# Patient Record
Sex: MALE | Race: WHITE
[De-identification: names, ages, dates, MRNs, and addresses within clinical notes are randomized per-mention and may not be internally consistent; named-entity substitution may affect disease eponyms.]

---

## 2018-09-04 NOTE — RAD
EXAM:

  CT Chest Without Intravenous Contrast



CLINICAL HISTORY:

  53 years old, male; Pain; Chest pain; Prior surgery; Surgery date: 1-6 

months; Surgery type: Port placement; Additional info: R sided cp, h/o lung ca



TECHNIQUE:

  Axial computed tomography images of the chest without intravenous contrast.  

All CT scans at this facility use at least one of these dose optimization 

techniques: automated exposure control; mA and/or kV adjustment per patient 

size (includes targeted exams where dose is matched to clinical indication); or 

iterative reconstruction.

  Coronal and sagittal reformatted images were created and reviewed.



COMPARISON:

  CHEST WO CT CHEST W/O 6/15/2018 11:40 AM



FINDINGS:

  Lungs:  Mild centrilobular emphysematous disease. Previously seen confluent 

volume loss encasing the right hilum is again identified with a maximal 

measurement of 7.7 cm (series 3, image 25) previously 5.7 cm. There is 

encasement with mild narrowing of the perihilar bronchi unchanged from prior. 

Tree in bud configuration nodules are seen apical posterior segment right upper 

lobe, lateral basal segment right lower lobe, and posterior basal segment left 

lower loss which are new. New pulmonary nodule posterior segment right upper 

lobe measures 0.6 cm (series 3, image 17). Pulmonary nodules versus 

consolidation with mild adjacent ground glass opacification posterior basal 

segment right lower lobe measures 1.5 cm (series 3, image 37) and lateral basal 

segment right lower lobe measuring 0.8 cm (series 3, image 36). Subsegmental 

atelectasis inferior lingula and lateral basal segment left lower lobe which is 

new compared to prior study. Peribronchial thickening of the left lower lobe 

bronchi which is advanced for the prior study with mucous plugs again seen.

  Pleural space:  Tiny left effusion. No right effusion. No pneumothorax.

  Heart:  Normal.  No cardiomegaly.  No pericardial effusion.

  Bones/joints:  No fractures. No suspicious bone lesions. Intrathecal lead 

terminates at T7.

  Soft tissues:  Normal. No mass or hemorrhage.

  Vasculature:  The aorta demonstrates mild atherosclerotic calcification.  No 

thoracic aortic aneurysm.

  Lymph nodes:  Normal.  No enlarged lymph nodes.

  Tubes, lines and devices:  Right chest infusion port terminates at the 

cavoatrial junction. No fluid collection or hyperemia around the port.



IMPRESSION:     

  1. Right perihilar posttreatment fibrosis worsening compared to prior study 

with interval onset multifocal endobronchial pneumonia with recurrent 

metastatic disease considered less likely. Findings superimposed on emphysema.



  2. New right upper lobe pulmonary nodule may be infectious inflammatory 

although attention on followup surveillance is recommended.

## 2018-09-05 NOTE — PN
Progress Note





- Progress Note


Date of Service: 09/05/18


SOAP: 


Subjective:


[]Still SOB and right sided chest pain. Chest pain sever. SOB started one week 

ago. Cough, non productive.  Low grade fevers, 99. No KASEY. Confusion and 

hallucination x 2 days. Thought dog was in car when not, thought was at work 

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  





PmHx:


- Stage III NSSLC, XRT and carbo/taxol


- MS


- PE in past, 2009


- COPD


- Bipolar





Acetaminophen (Tylenol Tab*)  650 mg PO Q4H PRN


   PRN Reason: FEVER/PAIN


Albuterol (Ventolin Hfa Inhaler*)  2 puff INH Q4H PRN


   PRN Reason: SOB/WHEEZING


Alprazolam (Xanax Tab*)  0.5 mg PO Q8H PRN


   PRN Reason: ANXIETY


Benzonatate (Tessalon Cap*)  100 mg PO BID PRN


   PRN Reason: COUGH


   Last Admin: 09/04/18 17:20 Dose:  100 mg


Citalopram Hydrobromide (Celexa Tab*)  40 mg PO DAILY [facility]


   Last Admin: 09/05/18 08:07 Dose:  40 mg


Docusate Sodium (Colace Cap*)  100 mg PO DAILY PRN


   PRN Reason: CONSTIPATION


Enoxaparin Sodium (Lovenox(*))  40 mg SUBCUT Q24H [facility]


   Last Admin: 09/04/18 17:20 Dose:  40 mg


Famotidine (Pepcid Tab*)  20 mg PO DAILY [facility]


   Last Admin: 09/05/18 08:07 Dose:  20 mg


Guaifenesin/Codeine Phosphate (Robitussin Ac 100mg-10mg*)  5 ml PO Q4H PRN


   PRN Reason: COUGH


   Last Admin: 09/05/18 08:07 Dose:  5 ml


Hydromorphone HCl (Dilaudid Tab*)  4 mg PO Q4H PRN


   PRN Reason: PAIN


   Last Admin: 09/05/18 09:39 Dose:  4 mg


Sodium Chloride (Ns 0.9% 1000 Ml*)  1,000 mls @ 125 mls/hr IV PER RATE [facility]


   Last Admin: 09/05/18 11:13 Dose:  125 mls/hr


Oxycodone HCl (Oxycontin(*))  15 mg PO BID [facility]


   Last Admin: 09/05/18 08:07 Dose:  15 mg


Polyethylene Glycol/Electrolytes (Miralax*)  17 gm PO DAILY PRN


   PRN Reason: CONSTIPATION


Zolpidem Tartrate (Ambien Tab*)  10 mg PO BEDTIME [facility]


   Last Admin: 09/04/18 21:26 Dose:  10 mg








Objective:


[]


 Vital Signs











Temp Pulse Resp BP Pulse Ox


 


 99.9 F   87   20   100/59   93 


 


 09/05/18 07:48  09/05/18 07:48  09/05/18 11:05  09/05/18 07:48  09/05/18 07:48








HEENT - no oral lesions. No LAD


Has bronchial congestion, crackles, R > L, no wheezing


RRR S1S2


Obese, NT ND


Ext Tr edema


Neuro- oriented to hospital, cohesive story, no hallucinations at this time. 





Assessment:


[]52 yo with h/o early stage NSSLC as well as chronic lung disease, history of 

PE, history of MS. Confusion, chest pain and SOB. Ddx: pneumonia, PE, delirium 

from inflammation or MS flair. 








Plan:


[]1. Pnemoni/Bronchitis


- Leofloxacin 500 mg IV daily


- Send sputum culture


2. COPD. Respiratory therapy, nebs


3. Possible PE


- Check Ddimer, non specific


- LE US


- Check V/Q scan and Lovenox empirically pending results. 


4. MS change. Check MRI brain. Held to check spine stimulator.


5. DNR and DNI, discussed with patient.

## 2018-09-05 NOTE — RAD
HISTORY: LE edema



COMPARISONS: None relevant



TECHNIQUE: Multiple transverse and longitudinal ultrasound images were obtained of the

bilateral lower extremities  from the level of the common femoral vein inferiorly through

to the infrapopliteal veins  using grayscale, color Doppler, and spectral Doppler imaging

with and without compression and with augmentation.



FINDINGS:

VEINS: The venous system of the bilateral lower extremities  is compressible throughout

its course, with normal flow on color Doppler imaging and normal response to augmentation

on spectral Doppler imaging. The second peroneal vein on the left is not well visualized

which may be secondary to technical limitations.



SOFT TISSUES: Unremarkable.



OTHER FINDINGS: None.



IMPRESSION: 

NO RIGHT LOWER EXTREMITY DEEP VEIN THROMBOSIS.

NO LEFT LOWER EXTREMITY DEEP VEIN THROMBOSIS.

## 2018-09-05 NOTE — RAD
Indication: Shortness of breath. History of PE.



Comparison: September 05, 2018 chest radiograph and September 04, 2018 CT.



Technique: Following administration of 9.790 mCi xenon-133 by inhalation anterior and

posterior ventilation images were obtained. Following the administration of  6.250 mCi of

Tc-99m macroaggregated albumin, perfusion images were obtained in multiple projections.



Report: The ventilation pattern is uniform with no evidence of air trapping. Photopenic

defect at the RIGHT upper lung zone corresponds with a MediPort.



Accounting for the photopenic defect related to the RIGHT chest wall Mediport there is

homogeneous perfusion throughout both lungs. 



IMPRESSION: 

#. No scintigraphic evidence for pulmonary embolism.

## 2018-09-05 NOTE — RAD
Indication: Shortness of breath.



2 views of the chest including dual energy PA views demonstrate neural stimulator leads in

place. Vascular congestion is noted. Central line is in place. Right hilar mass is noted.



IMPRESSION: Suggestion of a right hilar mass. Neurostimulator leads in place.

## 2018-09-06 NOTE — RAD
HISTORY: AMS, MRI contraindicated, h/o MS and NSCLC



COMPARISONS: May 02, 2017 



TECHNIQUE: Multiple contiguous axial CT scans were obtained of the head with and without 

intravenous contrast. 



FINDINGS: 

HEMORRHAGE/INFARCT: There is no hemorrhage or acute infarct.

MASSES/SHIFT: There is no mass or shift.



EXTRA-AXIAL SPACES: There are no extra-axial fluid collections.

SULCI AND VENTRICLES: The sulci and ventricles are normal in size and position for the

patient's stated age.



CEREBRUM: There is hypoattenuation of the periventricular and subcortical white matter.

There is no abnormal enhancement. 

BRAINSTEM: There are no focal parenchymal abnormalities.

CEREBELLUM: There are no focal parenchymal abnormalities.



VESSELS: The vessels are grossly normal.

PARANASAL SINUSES: The paranasal sinuses are clear.

ORBITS: The orbits are unremarkable.

BONES AND SOFT TISSUE: No bone or soft tissue abnormalities are noted.



OTHER: There is no abnormal enhancement. 



IMPRESSION: 

NO ACUTE INTRACRANIAL PATHOLOGY.

CHRONIC SMALL VESSEL ISCHEMIC CHANGES. NO ABNORMAL ENHANCEMENT.

## 2018-09-06 NOTE — CONS
CONSULTATION NOTE:

 

DATE OF CONSULT:  18

 

CONSULTING PROVIDER:  BIJAL Jenkins

 

REASON FOR CONSULT:  History of multiple sclerosis.

 

CHIEF COMPLAINT:  Generalized fatigue, "I want to establish care with a 
neurologist."

 

HISTORY OF PRESENT ILLNESS:  Mr. Rik Sanchez is a 53-year-old right-handed man
, who was diagnosed with multiple sclerosis in , who initially took Avonex 
for years and then was switched off to Copaxone in May 2017.  The patient had 
to stop after he was diagnosed with early stage non-small cell lung cancer as 
well as chronic lung disease.  He also has a history of pulmonary embolism.  
The patient presented on 18 with increased shortness of breath, confusion
, and chest pain.  He has been diagnosed with pneumonia and bronchitis.  
Initially, he was started on Zosyn for antibiotic therapy.  Neurology was 
consulted to evaluate for his history of multiple sclerosis.  The patient 
stated that he had last seen a neurologist 2 years ago.  He was driving to 
[place] to see Dr. Stanton, who retired.  He has not had any MRI studies.  He 
tried to get an MRI study 2 years ago at [facility], but due to 
his spinal cord stimulator that was implanted in , he is unable to obtain 
an MRI.  However, his stimulator does report that it is MRI conditional.  The 
patient has constellation of symptoms that include tremors, pain in the legs, 
spasticity, and generalized fatigue.  None of these symptoms are acute.  His 
significant other also complains of confusion and memory recall problems.  He 
reports no desire to want to do anything and feeling significantly down.  He 
has been feeling this way since .  Apparently, the patient seemed to have 
recent worsening of his symptoms over the last 1 week.  He does have an 
underlying infection, which is pneumonia, which can be playing a role with his 
neurological complaints.

 

The patient denied any new weakness or paresthesias.  The patient denied any 
new headaches, visual disturbance, swallowing difficulty, or impairment in 
bowel or bladder functions.  Goal of care today is to establish care with a 
neurologist according to the patient.

 

The patient had used the following medications:  Depakote, albuterol inhaler, 
and steroids over the last 48 hours.

 

PAST MEDICAL HISTORY:  Stage III non-small lung cancer.  He received radiation 
therapy and chemotherapy with carbo and Taxol.  Multiple sclerosis, PE, COPD, 
former tobacco use, bipolar disorder.

 

MEDICATIONS:  Home medications:

1.  Ambien 10 mg daily.

2.  Hydromorphone 4 mg p.o. every 6 hours as needed for pain.

3.  Guaifenesin 400 mg p.o. every 4 hours as needed.

4.  Benadryl 25 mg p.o. at bedtime as needed.

5.  Levalbuterol 1 puff every 4 hours as needed.

6.  Tizanidine 2 mg p.o. 3 times daily.

7.  Folic acid 1 mg p.o. daily.

8.  Xanax 0.25 mg p.o. 3 times daily as needed.

9.  Depakote 500 mg at bedtime.

10.  Citalopram 40 mg p.o. daily.

11.  Oxycodone 20 mg p.o. every 12 hours.

12.  Propranolol 10 mg p.o. 3 times daily.

13.  Multivitamins.

14.  Dextromethorphan 30 mg p.o. twice daily as needed.

 

ALLERGIES:  ADHESIVE TAPE, BEE VENOM.

 

FAMILY HISTORY:  Mother suffered from abdominal aortic aneurysm rupture.  
Father  of ALS and sister  of multiple sclerosis.

 

SOCIAL HISTORY:  The patient is on disability and is unable to work.

 

REVIEW OF SYSTEMS:  A 14-point review of systems was obtained and otherwise 
negative except for what was mentioned in the HPI.

 

PHYSICAL EXAM:  Vitals:  Temperature of 98.1, pulse of 94, respiratory rate of  
20, oxygen saturation of 96%, blood pressure of 109/59.  General:  Chronically 
ill- appearing man, who is obese, who is in no acute distress.  Alert, 
cooperative. Head is normocephalic, atraumatic.  Eyes:  Conjunctivae/corneas 
are clear.  No scleral icterus.  Neck is supple and symmetrical with no carotid 
bruits.  No lymphadenopathy.  Lungs:  Inspiratory and expiratory wheezing 
throughout the bibasilar area.  Cardiovascular:  Regular rhythm with normal S1, 
S2.  Extremities: Normal range of motion with no cyanosis.  Skin:  No skin 
lesions or laceration. Psych:  Flat affect, depressed mood, but otherwise easy 
to establish rapport. Neurological Examination:  The patient is awake, alert, 
and oriented to person, place, time, and general circumstances.  He does have 
psychomotor slowing and mild spastic dysarthria.  Cranial Nerves:  Normal 
confrontation testing bilaterally. Pupils are mid range and reactive.  
Sensation is intact on the forehead, cheeks, and jaw region bilaterally.  He 
has no facial droop.  He is able to hear throughout the history process.  
Normal strength against resistance.  Tongue is symmetrical and midline with no 
atrophy or fasciculation.  Motor Examination:  Generalized low activation, but 
otherwise no spasticity in the upper extremities.  He does have increasing tone 
of the lower extremities.  He has nearly normal strength throughout that is 
symmetrical 5/5, but again he required re-encouragement to try his best in the 
motor examination.  He does have action-induced tremors, right worse than left 
with low amplitude high frequency that resolves at rest.  Reflexes:  Right/left
, brachioradialis 3/3, biceps 3/3, triceps 3/3, patella 3/3, ankle 2/2, plantar 
flexor/flexor.  Sensation:  Reduced sensation to light touch and pinprick on 
the feet, but improved at the proximal limbs.  Reduced vibration at the great 
toes. Normal proprioception.  Coordination:  Normal finger-to-nose, but 
bradykinesia. Gait was not assessed.  The patient is walker dependent at 
baseline.

 

DIAGNOSTIC STUDIES/LAB DATA:  WBC 5.2, hemoglobin 11.4, hematocrit 34, platelet 
count is 219.  Sodium 139, potassium 4.2, chloride 102, carbon dioxide 24.  AST 
and ALT are normal.  UA is negative.  Valproic acid level is 39.

 

CT head with contrast completed on 18 showed no evidence of any 
intracranial disease and no mass effect.  There were no enhancing lesions.

 

ASSESSMENT AND RECOMMENDATIONS:  Mr. Rik Sanchez is a 53-year-old man with 
history of  multiple sclerosis with relapsing-remitting and possibly secondary 
progressive multiple sclerosis, who is not on any disease-modifying therapy 
since the diagnosis of lung cancer, who presented with pneumonia.  Neurology 
was consulted to evaluate for the patient's confusion and tremors.  The patient 
denied any new neurological deficits other than he has been feeling excessively 
fatigued and his partner is complaining regarding his memory and slowing 
cognition. Neurologically, the patient appears to have significant psychomotor 
slowing, but no focal neurological deficits with diffuse hyperreflexia on 
examination, which is consistent with his demyelinating disease.  Overall, I 
suspect the patient's tremors to be multifactorial related to:

1.  Steroid use.

2.  Multiple sclerosis.

3.  Depakote use.

4.  Albuterol use.

 

I did reassure him that hopefully once he gets off the steroids and consider 
lowering the dose of Depakote, his tremors would improve.

 

In regards to the fatigue, please check any reversible causes of fatigue such 
as vitamin D deficiency, vitamin B12, and thyroid disease.  I have ordered B12, 
vitamin D, and TSH to further evaluate.  I will follow up with lab results. 
However, again, the fatigue can be related to his multiple sclerosis, 
especially that it is not being treated at this time.  There are medications 
that can help with fatigue such as amantadine.  I do not recommend starting any 
medications at this time, as I do suspect the patient is being overmedicated, 
especially with narcotic therapy.  That brings up another point of medication-
induced fatigue.  The patient is on oxycodone, morphine, hydromorphone.  
Minimize some of the medications, especially we want to decrease the Ambien 10 
mg nighttime to possibly 5 mg.

 

History of multiple sclerosis.  I suspect relapsing-remitting with some 
secondary progression - the patient would like to follow up with us in the 
Neurology Clinic at [facility].  I will have our staff schedule an appointment within 
the next 4 weeks.  We do need to discuss long-term disease-modifying therapy.  
I think going back on Copaxone would be the best option for him.  The patient 
is not on chemotherapy any longer and therefore should be on some disease-
modifying therapy.  The important thing here is we need to obtain some imaging 
of the brain and spinal cord with and without contrast, which we can do as an 
outpatient.

 

In the setting of pneumonia, I do not suspect the patient to have multiple 
sclerosis exacerbation.  The patient also agrees given that his symptoms are 
chronic and stable at this point.

 

TIME SPENT:  I spent 70 minutes of which greater than 50% was spent obtaining 
history, reviewing the chart, examining the patient, and discussing the 
treatment plan and options as mentioned above.

 

I will sign off, but will follow up with the laboratory results.  Please 
contact me for any questions or concerns.

 

 [id number]/[id number] #: [id number]

KARY

## 2018-09-06 NOTE — PN
Progress Note





- Progress Note


Date of Service: 09/06/18


SOAP: 


Subjective:


[Reports feeling poorly.  Complaining of severe R sided chest pain and feeling 

of chest restriction.  Productive cough.  Tmax 100.5F.  He has a fine tremor 

and remains quite weak.  He is concern that his current symptoms represent MS 

exacerbation and  requests to be treated with IV Solumedrol.]








Objective:


[


 Laboratory Results - last 24 hr











  09/06/18 09/06/18 09/06/18





  07:45 07:52 07:52


 


WBC   5.2 


 


RBC   3.84 L 


 


Hgb   11.4 L 


 


Hct   34 L 


 


MCV   89 


 


MCH   30 


 


MCHC   34 


 


RDW   13 


 


Plt Count   219 


 


MPV   8.5 


 


Neut % (Auto)   70.3 


 


Lymph % (Auto)   11.4 L 


 


Mono % (Auto)   16.8 H 


 


Eos % (Auto)   1.4 


 


Baso % (Auto)   0.1 


 


Absolute Neuts (auto)   3.7 


 


Absolute Lymphs (auto)   0.6 L 


 


Absolute Monos (auto)   0.9 H 


 


Absolute Eos (auto)   0.1 


 


Absolute Basos (auto)   0 


 


Absolute Nucleated RBC   0 


 


Nucleated RBC %   0 


 


Sodium    139


 


Potassium    4.2


 


Chloride    102


 


Carbon Dioxide    24


 


Anion Gap    13 H


 


BUN    8


 


Creatinine    0.68


 


Est GFR ( Amer)    147.0


 


Est GFR (Non-Af Amer)    121.5


 


BUN/Creatinine Ratio    11.8


 


Glucose    87


 


Calcium    8.6


 


Total Bilirubin    0.90


 


AST    24


 


ALT    19


 


Alkaline Phosphatase    79


 


B-Natriuretic Peptide  137 H  


 


Total Protein    5.9 L


 


Albumin    3.4


 


Globulin    2.5


 


Albumin/Globulin Ratio    1.4


 


Vitamin B12    545


 


25-OH Vitamin D Total    18.0 L


 


TSH    1.42

















Acetaminophen (Tylenol Tab*)  650 mg PO Q4H PRN


   PRN Reason: FEVER/PAIN


   Last Admin: 09/05/18 18:21 Dose:  650 mg


Albuterol (Ventolin Hfa Inhaler*)  2 puff INH Q4H PRN


   PRN Reason: SOB/WHEEZING


Albuterol/Ipratropium (Duoneb (Albuterol 2.5 Mg/Ipratropium 0.5 Mg))  1 neb INH 

Q4H PRN


   PRN Reason: SOB/WHEEZING


   Last Admin: 09/06/18 09:53 Dose:  1 neb


Alprazolam (Xanax Tab*)  0.5 mg PO Q8H PRN


   PRN Reason: ANXIETY


Benzonatate (Tessalon Cap*)  100 mg PO BID PRN


   PRN Reason: COUGH


   Last Admin: 09/06/18 17:52 Dose:  100 mg


Docusate Sodium (Colace Cap*)  100 mg PO DAILY PRN


   PRN Reason: CONSTIPATION


Famotidine (Pepcid Tab*)  20 mg PO DAILY [facility]


   Last Admin: 09/06/18 08:49 Dose:  20 mg


Guaifenesin/Codeine Phosphate (Robitussin Ac 100mg-10mg*)  5 ml PO Q4H PRN


   PRN Reason: COUGH


   Last Admin: 09/06/18 17:52 Dose:  5 ml


Hydromorphone HCl (Dilaudid Tab*)  6 mg PO Q4H PRN


   PRN Reason: PAIN


   Last Admin: 09/06/18 12:30 Dose:  6 mg


Piperacillin Sod/Tazobactam (Sod 3.375 gm/ Sodium Chloride)  100 mls @ 25 mls/

hr IVPB Q8H [facility]


   Last Admin: 09/06/18 15:53 Dose:  25 mls/hr


Morphine Sulfate (Morphine Vial*)  5 mg IV Q4H PRN


   PRN Reason: PAIN


   Last Admin: 09/06/18 15:53 Dose:  5 mg


Oxycodone HCl (Oxycontin(*))  30 mg PO BID [facility]


Polyethylene Glycol/Electrolytes (Miralax*)  17 gm PO DAILY PRN


   PRN Reason: CONSTIPATION


Prednisone (Deltasone Tab*)  40 mg PO DAILY [facility]


Zolpidem Tartrate (Ambien Tab*)  10 mg PO BEDTIME [facility]


   Last Admin: 09/05/18 22:42 Dose:  10 mg








Vital Signs:











Temp Pulse Resp BP Pulse Ox


 


 98.0 F   80   22   127/86   93 


 


 09/06/18 19:43  09/06/18 19:43  09/06/18 19:43  09/06/18 19:43  09/06/18 19:43








Exam


Gen: Chronically ill appearing 52 yo male who appears uncomfortable and 

accompanied by his wife


HEENT: MMM, no thrush


CV: RRR, no m/r/g


Resp: diffuse rhonchi and occasional wheeze


Abd: soft, nonTTP


Ext: trace edema


Psych: alert, appropriately oriented.  Confusion appears to have resolved


Neuro: fine tremor with intention.  Strength grossly intact]








[Assessment:


[]52 yo with h/o early stage NSCLC as well as chronic lung disease, history of 

PE, and MS. Admitted with Confusion, chest pain and SOB. Ddx: pneumonia, PE, 

delirium from inflammation or MS flair. PE w/u neg with neg LE doppler and VQ 

scan.  Empiric anticoagulation stopped.








Plan:


[]1. Pneumonia


- cont Zosyn


- febrile overnight


- productive cough


- associated COPD exacerbation


2. COPD exacerbation


- cont prn nebs


- start corticosteroids


4. AMS


- MRI brain ordered


- per radiology, nerve stimulator is not compatible with MRI although patient 

believes he has had MRIs with stimulator in place


- CT brain with contrast completed that shows no NAD


- discussed case with neurology and requested consultation


- if any MS symptoms are exacerbated, likely due to acute infection and high 

dose steroids would not be indicated


- requested most recent progress note from [facility] neurologist


5. Chronic pain


- managed by pain specialist out of [place]


- exacerbated by coughing


- increased oxycontin dosing and prn dilaudid


6. NSCLC


- no evidence of recurrent disease


7. DNR and DNI


]

## 2018-09-07 NOTE — PN
NEUROLOGY PROGRESS NOTE:

 

DATE OF SERVICE:  09/07/18

 

REASON FOR FOLLOWUP:  Generalized fatigue.

 

SUBJECTIVE:  The patient is still having a trouble with tremors this morning.  He feels fatigued.  He
 still received all the medications that I recommended to decrease yesterday such as the Ambien, oxyc
odone, morphine, hydromorphone.

 

The patient's vitamin D levels came back significant low at 18.  TSH is normal at 1.42.  Vitamin B12 
is normal at 545.

 

Medications unchanged from yesterday.

 

PHYSICAL EXAMINATION:  Vitals:  Temperature 98 degrees, pulse of 84, respiratory rate of 18, oxygen s
aturation 97%.  Blood pressure 100/67.  General:  Chronically ill appearing fatigued man, in no acute
 distress.  He has bilateral and truncal tremors that are high frequent and high amplitude and worse 
with action.  He has no resting tremor.  Head:  Normocephalic, atraumatic.  Eyes:  Conjunctivae/corne
as are clear.  Neck is supple and symmetrical with no carotid bruit.  Neurological:  The patient is a
wake, alert, and oriented to person, place, time, and general circumstances with psychomotor slowing.
  Pupils equal, round and reactive to light. Extraocular muscles are intact.  No facial asymmetry.  T
ongue is symmetrical and midline with no atrophy.  Generalized low activation, but otherwise no spast
icity in the upper extremities.  He does have increased tone in the lower extremities. He has normall
y strength 5/5 throughout the extremities.  Hyperreflexia except for 2/2 at the ankles.  Reduced sens
ation to light and pinprick in the feet, but improved at the proximal limbs.  He has got bradykinesia
 to finger-to-nose, but no asymmetry or dysmetria.

 

ASSESSMENT:  Mr. Rik Sanchez is a 53-year-old male with a history of multiple sclerosis of relapsing
 remitting and possibly secondary to progressive multiple sclerosis who is not on any disease-modifyi
ng therapy who is complaining of generalized fatigue.  The patient does have a history of lung cancer
 status post chemotherapy and radiation.  The patient's fatigue is probably related to polypharmacy a
s well as vitamin D deficiency.  I started him on daily vitamin D supplements 2000 units daily.  He s
hould follow up at our neurology clinic in 4 to 6 weeks.  It is reasonable to put him on Copaxone onc
e he follows up.  He also will need outpatient MRI as the patient spinal stimulator that is MRI condi
tional. Further evaluation should be done in the future.  I do not suspect he has multiple sclerosis 
exacerbation given his symptoms are mostly nonspecific and most likely are trigged from the pneumonia
.  I will sign off.  Please contact us for any questions or concerns.

 

 [id number]/[facility] #: [id number]

## 2018-09-09 NOTE — DS
CC:  Wilmer Pompa DO; Dr. Alissa Sánchez; [facility] *

 

DISCHARGE SUMMARY:

 

DATE OF ADMISSION:  18

 

DATE OF DISCHARGE:  18

 

PRIMARY CARE PROVIDER:  Wilmer Pompa DO

 

PRIMARY ONCOLOGIST:  Alissa Sánchez MD

 

CONSULTING NEUROLOGIST:  Dr. Araujo.

 

ATTENDING PHYSICIAN:  Dmitriy Naranjo MD * (DICTATED BY BIJAL LANDURM)

 

DISCHARGING PROVIDER:  BIJAL Landrum

 

PRIMARY DISCHARGE DIAGNOSES:

1.  Pneumonia.

2.  Chronic obstructive pulmonary disease exacerbation.

3.  Altered mental status - most likely secondary to pneumonia.

4.  Chronic pain - exacerbated by acute illness.

5.  Multiple sclerosis.

6.  Non-small cell lung carcinoma, status post concurrent chemoradiation 
finished in 2017.

 

SECONDARY DISCHARGE DIAGNOSIS:  Bipolar disorder.

 

DISCHARGE MEDICATIONS:

1.  Alprazolam 0.25 mg p.o. 3 times daily as needed for anxiety.

2.  Symbicort 1 puff inhaled twice daily.

3.  Citalopram 40 mg p.o. daily.

4.  Delsym 30 mg p.o. twice daily as needed for cough.

5.  Diphenhydramine 25 mg p.o. at bedtime as needed.

6.  Depakote 500 mg p.o. at bedtime.

7.  Folic acid 1 mg p.o. daily.

8.  Xopenex 1 puff inhaled q.4 hours as needed for shortness of breath.

9.  Multivitamin 1 tablet p.o. daily.

10.  Propranolol 20 mg 3 times daily.

11.  Tizanidine 2 mg p.o. 3 times daily.

12.  Ambien 10 mg p.o. at bedtime.

13.  Augmentin 875 mg p.o. twice daily x10 days.

14.  Tessalon Perles 100 mg p.o. twice daily as needed for cough.

15.  Vitamin D 2000 units p.o. daily.

16.  Guaifenesin/codeine 5 mL p.o. q.4 hours as needed for cough.

17.  Hydromorphone 6 mg p.o. q.4 hours as needed for pain.

18.  OxyContin 30 mg p.o. twice daily.

19.  Prednisone with instructions to take 40 mg x3 days, then 20 mg x3 days, 
then 10 mg x3 days.

 

MEDICATION CHANGES:

1.  Increase hydromorphone.

2.  Increase OxyContin.

3.  Augmentin x10 days.

4.  Start p.r.n. Tessalon.

5.  Start p.r.n. Robitussin A-C.

6.  Prednisone with a tapering dose as described above.

7.  Start vitamin D.

 

HOSPITAL IMAGIN.  CT chest without contrast shows a right perihilar posttreatment fibrosis, 
which is worse when compared to prior study with interval onset of multifocal 
endobronchial pneumonia with recurrent metastatic disease considered as less 
likely with findings of superimposed emphysema.  There is a new right upper 
pulmonary nodule, which may be infectious or inflammatory, but followup imaging 
is recommended.

2.  Chest x-ray on 17 showed suggestion of a right hilar mass and the 
stimulator leads in place.

3.  Venous Doppler study is negative for DVT bilaterally.

4.  V/Q scan is a low probability scan.

5.  CT brain shows some chronic microvascular changes, but no acute disease 
including obvious metastatic focus.

 

HOSPITAL COURSE:  This is a 53-year-old gentleman with a history of limited 
stage non-small cell lung cancer that is treated approximately a year ago with 
concurrent chemo and radiation.  He has been in active surveillance with 
imaging being CT of the chest from 2018, which did not show any recurrent 
disease.  The patient presented to medical oncology office for followup with 
multiple complaints.  He had developed a cough with right-sided chest pain in 
addition to some increased confusion and general lethargy and overall weakness.
  Initial concern was for CNS pathology and the patient was referred to the 
hospital for direct admission.  To further complicate his medical history is 
known multiple sclerosis for which he has been off of medical therapy since his 
cancer diagnosis approximately 16 months ago and had been followed by Neurology 
at [place].  Additionally, the patient has bipolar disorder for which he is 
treated by Psychiatry with Depakote.  In , he had Depakote and ammonia 
levels checked, which showed elevated ammonia levels and he was subsequently 
started on lactulose, but was unable to tolerate this medication.  The Depakote 
had been decreased from 500 mg twice daily to 500 mg once daily at bedtime as 
well.

 

Initial labs showed essentially normal CBC with just mild normocytic anemia 
with hemoglobin of 11.9 g/dL.  Comprehensive metabolic panel was unremarkable.  
CRP was significantly elevated at 291.  CT of the chest was obtained, which 
showed findings consistent with what was most likely multifocal pneumonia.  MRI 
of the brain with and without contrast was ordered, but then became apparent 
the patient had a nerve stimulator in place in his back and research from the 
 showed that this was not MRI compatible.  Then, a CT head was 
completed with contrast instead, which showed no acute disease.  Ammonia levels 
were also checked and noted to be normal in the mid 50s on two occasions.

 

The patient subsequently developed a fever and had persistent cough with severe 
right-sided chest pain.  He had a noted history of PE and for this reason, 
lower extremity Doppler and V/Q scan were both performed, which were both 
negative for obvious clot.  The patient was started on Zosyn for his pneumonia.

 

The patient complained of severe right-sided chest pain mostly associated with 
deep inspiration and coughing, most likely due to pleurisy related to the 
pneumonia.  He is followed by pain management specialist at [facility] 
for chronic pain medications including OxyContin and Dilaudid were increased 
during his hospitalization.

 

In addition to chest pain as described above, the patient had significant chest 
tightness and rhonchi and wheezing on exam.  _____ COPD exacerbation with 
nebulizer treatment and steroids.

 

The patient also complained of generalized weakness and developed a resting 
tremor during his hospitalization.  The concern for an exacerbation of his MS 
was brought up and neurologist, Dr. Araujo, was consulted.  He suggested that 
exacerbation of his MS could be responsible for his tremors; but suggested that 
in the setting of acute illness, it is more likely that his current neurologic 
symptoms were result of an acute infectious process and he did not require 
specific treatment for an MS flare, although he did recommend a close followup 
with Neurology with recommendation to potentially reinitiate medical therapy.

 

DISPOSITION AND FOLLOWUP PLAN:  The patient is being discharged to home where 
he lives with his wife.  Recommended 10 days of Augmentin for treatment of his 
pneumonia as well as a steroid taper for his COPD exacerbation.  
Recommendations were Neurology were to decrease or discontinue the Depakote due 
to his complaints of tremor.  This can be addressed with his primary care 
provider and/or mental health provider depending on the stability of his 
bipolar disorder and the status of his tremor after leaving the hospital.  Of 
note, CT scan was performed during this hospitalization did note a new 
pulmonary nodule as well as what appeared to be multifocal pneumonia.  We will 
plan followup in the oncology clinic in approximately 6 weeks with a repeat CT 
scan prior to that.  The patient requires followup with his primary care 
provider within 1 week of discharge.

 

____________________________________ MARTÍNEZ IVERSON, PA

 

[id number]/[facility] #: [id number]

[facility]

## 2018-12-03 NOTE — ED
Progress





- Progress Note


Progress Note: 





Patient is medically cleared for discharge by Mental Health Examiner and Dr. Barker (Psych). 





- Consult/PCP


Time Called: 11:46





Course/Dx





- Course


Course Of Treatment: Patient was signed out from Dr. Stewart at end of shift, 

pending MHE.  Patient was seen by MHE at 1950. Patient is medically cleared for 

discharge by Mental Health Examiner and Dr. Barker (Psych).





- Diagnoses


Provider Diagnoses: 


 Depression








- Provider Notifications


Discussed Care Of Patient With: Grzegorz Barker - Psych


Time Discussed With Above Provider: 19:50 - We discussed patient care with Dr. Barker (Psych) at 1950 and he recommended discharging patient home.





Discharge





- Sign-Out/Discharge


Documenting (check all that apply): Patient Departure, Receiving Sign-Out


Receiving patient FROM: Clifton Stewart





- Discharge Plan


Condition: Stable


Disposition: HOME


Patient Education Materials:  Depression (ED)


Referrals: 


Wilmer Pompa,  [Primary Care Provider] - 2 Days


Additional Instructions: 


Return to the ED for any new or worsening symptoms.





- Attestation Statements


Document Initiated by Scribe: Yes


Documenting Scribe: Ricco Larios


Provider For Whom Scribe is Documenting (Include Credential): Mark Kraft MD


Scribe Attestation: 


Ricco MAYES, scribed for Mark Kraft MD on 12/03/18 at 1958. 


Status of Scribe Document: Ready
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
## 2020-01-08 ENCOUNTER — HOSPITAL ENCOUNTER (INPATIENT)
Dept: HOSPITAL 25 - ED | Age: 55
LOS: 6 days | Discharge: HOME HEALTH | DRG: 885 | End: 2020-01-14
Attending: PSYCHIATRY & NEUROLOGY | Admitting: PSYCHIATRY & NEUROLOGY
Payer: MEDICARE

## 2020-01-08 DIAGNOSIS — Z81.1: ICD-10-CM

## 2020-01-08 DIAGNOSIS — G47.33: ICD-10-CM

## 2020-01-08 DIAGNOSIS — R45.851: ICD-10-CM

## 2020-01-08 DIAGNOSIS — F32.2: Primary | ICD-10-CM

## 2020-01-08 DIAGNOSIS — Z85.118: ICD-10-CM

## 2020-01-08 DIAGNOSIS — Z92.3: ICD-10-CM

## 2020-01-08 DIAGNOSIS — Z91.19: ICD-10-CM

## 2020-01-08 DIAGNOSIS — Z92.21: ICD-10-CM

## 2020-01-08 DIAGNOSIS — G89.29: ICD-10-CM

## 2020-01-08 DIAGNOSIS — Z99.81: ICD-10-CM

## 2020-01-08 DIAGNOSIS — Z66: ICD-10-CM

## 2020-01-08 DIAGNOSIS — Z88.8: ICD-10-CM

## 2020-01-08 DIAGNOSIS — J18.9: ICD-10-CM

## 2020-01-08 DIAGNOSIS — J44.9: ICD-10-CM

## 2020-01-08 DIAGNOSIS — G35: ICD-10-CM

## 2020-01-08 DIAGNOSIS — M62.838: ICD-10-CM

## 2020-01-08 DIAGNOSIS — Z87.891: ICD-10-CM

## 2020-01-08 LAB
ALBUMIN SERPL BCG-MCNC: 3.2 G/DL (ref 3.2–5.2)
ALBUMIN/GLOB SERPL: 1 {RATIO} (ref 1–3)
ALP SERPL-CCNC: 52 U/L (ref 34–104)
ALT SERPL W P-5'-P-CCNC: 38 U/L (ref 7–52)
ANION GAP SERPL CALC-SCNC: 8 MMOL/L (ref 2–11)
AST SERPL-CCNC: 30 U/L (ref 13–39)
BASOPHILS # BLD AUTO: 0 10^3/UL (ref 0–0.2)
BUN SERPL-MCNC: 12 MG/DL (ref 6–24)
BUN/CREAT SERPL: 14 (ref 8–20)
CALCIUM SERPL-MCNC: 8.5 MG/DL (ref 8.6–10.3)
CHLORIDE SERPL-SCNC: 104 MMOL/L (ref 101–111)
EOSINOPHIL # BLD AUTO: 0.3 10^3/UL (ref 0–0.6)
GLOBULIN SER CALC-MCNC: 3.2 G/DL (ref 2–4)
GLUCOSE SERPL-MCNC: 92 MG/DL (ref 70–100)
HCO3 SERPL-SCNC: 27 MMOL/L (ref 22–32)
HCT VFR BLD AUTO: 37 % (ref 42–52)
HGB BLD-MCNC: 12.4 G/DL (ref 14–18)
LYMPHOCYTES # BLD AUTO: 0.6 10^3/UL (ref 1–4.8)
MCH RBC QN AUTO: 30 PG (ref 27–31)
MCHC RBC AUTO-ENTMCNC: 34 G/DL (ref 31–36)
MCV RBC AUTO: 89 FL (ref 80–94)
MONOCYTES # BLD AUTO: 0.5 10^3/UL (ref 0–0.8)
NEUTROPHILS # BLD AUTO: 6.3 10^3/UL (ref 1.5–7.7)
NRBC # BLD AUTO: 0 10^3/UL
NRBC BLD QL AUTO: 0
PLATELET # BLD AUTO: 288 10^3/UL (ref 150–450)
POTASSIUM SERPL-SCNC: 3.9 MMOL/L (ref 3.5–5)
PROT SERPL-MCNC: 6.4 G/DL (ref 6.4–8.9)
RBC # BLD AUTO: 4.11 10^6 /UL (ref 4.18–5.48)
SODIUM SERPL-SCNC: 139 MMOL/L (ref 135–145)
WBC # BLD AUTO: 7.8 10^3/UL (ref 3.5–10.8)

## 2020-01-08 PROCEDURE — 80329 ANALGESICS NON-OPIOID 1 OR 2: CPT

## 2020-01-08 PROCEDURE — 81003 URINALYSIS AUTO W/O SCOPE: CPT

## 2020-01-08 PROCEDURE — 80061 LIPID PANEL: CPT

## 2020-01-08 PROCEDURE — 83036 HEMOGLOBIN GLYCOSYLATED A1C: CPT

## 2020-01-08 PROCEDURE — 80307 DRUG TEST PRSMV CHEM ANLYZR: CPT

## 2020-01-08 PROCEDURE — 82803 BLOOD GASES ANY COMBINATION: CPT

## 2020-01-08 PROCEDURE — 99284 EMERGENCY DEPT VISIT MOD MDM: CPT

## 2020-01-08 PROCEDURE — 80053 COMPREHEN METABOLIC PANEL: CPT

## 2020-01-08 PROCEDURE — 90732 PPSV23 VACC 2 YRS+ SUBQ/IM: CPT

## 2020-01-08 PROCEDURE — 80320 DRUG SCREEN QUANTALCOHOLS: CPT

## 2020-01-08 PROCEDURE — 99222 1ST HOSP IP/OBS MODERATE 55: CPT

## 2020-01-08 PROCEDURE — 84443 ASSAY THYROID STIM HORMONE: CPT

## 2020-01-08 PROCEDURE — 36415 COLL VENOUS BLD VENIPUNCTURE: CPT

## 2020-01-08 PROCEDURE — 71045 X-RAY EXAM CHEST 1 VIEW: CPT

## 2020-01-08 PROCEDURE — 80048 BASIC METABOLIC PNL TOTAL CA: CPT

## 2020-01-08 PROCEDURE — 99238 HOSP IP/OBS DSCHRG MGMT 30/<: CPT

## 2020-01-08 PROCEDURE — 85025 COMPLETE CBC W/AUTO DIFF WBC: CPT

## 2020-01-08 PROCEDURE — G0480 DRUG TEST DEF 1-7 CLASSES: HCPCS

## 2020-01-08 PROCEDURE — 99231 SBSQ HOSP IP/OBS SF/LOW 25: CPT

## 2020-01-08 PROCEDURE — 99233 SBSQ HOSP IP/OBS HIGH 50: CPT

## 2020-01-08 PROCEDURE — 94640 AIRWAY INHALATION TREATMENT: CPT
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
## 2020-01-09 LAB
APAP SERPL-MCNC: < 15 MCG/ML
SALICYLATES SERPL-MCNC: < 2.5 MG/DL (ref ?–30)
TSH SERPL-ACNC: 2.02 MCIU/ML (ref 0.34–5.6)

## 2020-01-09 RX ADMIN — BACLOFEN SCH MG: 20 TABLET ORAL at 12:50

## 2020-01-09 RX ADMIN — THERA TABS SCH TAB: TAB at 11:44

## 2020-01-09 RX ADMIN — ZOLPIDEM TARTRATE SCH MG: 10 TABLET, FILM COATED ORAL at 20:44

## 2020-01-09 RX ADMIN — LEVOFLOXACIN SCH MG: 250 TABLET, FILM COATED ORAL at 11:45

## 2020-01-09 RX ADMIN — PANTOPRAZOLE SODIUM SCH MG: 40 TABLET, DELAYED RELEASE ORAL at 11:45

## 2020-01-09 RX ADMIN — HYDROMORPHONE HYDROCHLORIDE PRN MG: 4 TABLET ORAL at 12:41

## 2020-01-09 RX ADMIN — OXYCODONE HYDROCHLORIDE SCH MG: 10 TABLET, FILM COATED, EXTENDED RELEASE ORAL at 20:43

## 2020-01-09 RX ADMIN — MOMETASONE FUROATE AND FORMOTEROL FUMARATE DIHYDRATE SCH PUFF: 100; 5 AEROSOL RESPIRATORY (INHALATION) at 20:11

## 2020-01-09 RX ADMIN — LORAZEPAM ONE MG: 2 INJECTION INTRAMUSCULAR; INTRAVENOUS at 06:03

## 2020-01-09 RX ADMIN — BACLOFEN SCH MG: 20 TABLET ORAL at 19:22

## 2020-01-09 RX ADMIN — MOMETASONE FUROATE AND FORMOTEROL FUMARATE DIHYDRATE SCH: 100; 5 AEROSOL RESPIRATORY (INHALATION) at 19:51

## 2020-01-09 RX ADMIN — LORAZEPAM ONE MG: 2 INJECTION INTRAMUSCULAR; INTRAVENOUS at 06:15

## 2020-01-09 RX ADMIN — ALPRAZOLAM PRN MG: 0.5 TABLET ORAL at 12:41

## 2020-01-09 RX ADMIN — IPRATROPIUM BROMIDE AND ALBUTEROL SULFATE PRN NEB: .5; 3 SOLUTION RESPIRATORY (INHALATION) at 12:13

## 2020-01-09 RX ADMIN — ACETAMINOPHEN PRN MG: 325 TABLET ORAL at 11:44

## 2020-01-09 RX ADMIN — CITALOPRAM SCH MG: 40 TABLET ORAL at 11:45

## 2020-01-09 RX ADMIN — HYDROMORPHONE HYDROCHLORIDE PRN MG: 4 TABLET ORAL at 23:53

## 2020-01-09 NOTE — HP
H&P (Free Text)


History and Physical: 


 


Justification for admission:  Immediate Safety. 





CC " I just want to be comfortable"





The patient was brought to [facility] by his brother after he placed 

guns on his bed in order to use them to end his life. He was treated at 

[facility] for pneumonia and received supportive measures against 

his wishes because he did not have MOLST form completed. He  reported having 

stage 4 lung cancer and fears being in pain and feeling that he is being 

suffocated. He reported being angry about not getting comfort care after he 

thought the necessary paperwork was already completed. He explained that he has 

been thinking about his code status for some time (since 2016) and after 

receiving radiation and chemotherapy wishes to only receive comfort care. He 

wished to appoint his brother Nader to be his health care proxy. He explained the 

nature of his illness and expressed the degree of suffering he has endured. The 

guns were removed from the home and this was confirmed by his brother.


He reported poor sleep and normal appetite. The patient reported being in pain 

and has difficulty breathing most of the day and is upset at his girlfriend 

because he thought she filled out the paperwork so that he would not receive 

life sustaining treatment. The patient denied homicidal ideation intent or 

plan. The patient denied auditory and/  or visual hallucinations. 





MDD


Reported  feeling depressed while having diminished interests which were found 

to be enjoyable in the past. Reported feelings of hopelessness , and  

worthlessness. Reported  loss of energy  or lack of motivation to complete 

tasks. Reported thoughts that he would be better off dead.  





Anxiety 


Denied having symptoms of anxiety such as having times where heart feels that 

it is beating out of chest  , sweaty palms, or shallow breathing.  Denied 

having uncomfortable or intrusive thoughts. Denied  feeling restless, high 

strung, or worrying too much most of the time. 





Bipolar  


Denied symptoms of sathish such as having many ideas at once. Denied increased 

talkativeness where  no one can interrupt.  


Denied  feeling irritable most of the time while having an persistent abundance 

of energy most of the day without the use of energy drinks, stimulants, or 

recreational drug use. Denied an increase in intensity in goal directed 

activities. Denied having the decreased need to sleep for days , having 

prolonged elevated  mood , or feeling on top of the world. 


 Denied impulsive risky sexual encounters. Denied spending money recklessly , 

going on spending sprees wiping out savings.  Denied impulsively traveling out 

of town or country, having super martel, and unrealistic wealth or fame.  





Psychosis 


Does not endorse  hearing  things that other people do not hear or seeing 

things other people do not see.  Denied  feeling that TV is making references. 

Denied  feeling that people are spying  , following , or reading their  

thoughts. 





Phobias: 


Patient denied having excessive fear of a particular thing or situation. 





Eating disorders:


 Patient denied having excessive eating habits or feelings of guilt after 

eating. Denied repeated episodes of self induced vomiting after eating. 





PTSD


Denied flashbacks, nightmares and avoidance of a prior traumatic event.  





PAST PSYCHIATRIC HISTORY:


         Prior Diagnosis :  Major Depressive Disorder


         History of past Psychiatric Hospitalizations:  1 prior psychiatric 

admission at [facility].


          History of past suicide/homicide attempts : Refused to elaborate but 

mentioned 1 suicide attempt unknown method in 2016. Denied self injurious 

behaviors.  


         Outpatient follow-up:  Dr. Pompa PCP


         Medications: Past trials of medications include celexa, remeron 

alprazolam, zolpidem 


         Guardianship: None. Health care proxy Nader his brother





 FAMILY HISTORY: 


- Suicide: Denied family history of suicide. 


- Mental illness:  Denied a history of mental health in immediate family 

members.


- Substance abuse: Father abused alcohol





SUBSTANCE ABUSE HISTORY:


- EtOH: Denied  No associated legal issues, blackouts, seizures, DTs or past 

hospitalizations due to alcohol.  


- Tobacco:  smoked since age 11 until 2016 and quit 3 years ago declined 

replacement therapy


- Cannabis: Denied 


- Heroin: Denied 


- Cocaine: Denied 


- Substance abuse treatment: Denied past substance abuse treatment 





SOCIAL HISTORY:


Born in [facility]  and raised by both parents.  


- Living situation:  Currently lives in [facility] with brother and girlfriend 


- Employment history: worked as EMT for 30 years


- Relationship:  4 children and currently not  but in a relationship 

with his girlfriend whom he lives with


- Legal history: Denied


-  service history: Denied





PAST MEDICAL HISTORY:    


Stage 4 Lung cancer, MS. 


- Allergies:  Hydromorphone 


 


Physical Exam:  


Please see ED note 





Mental Status Exam on Admission 


APPEARANCE :   54 year old  male using wheelchair to ambulate. Patient 

is disheveled and appears to have poor hygiene and grooming


BEHAVIOR: Irritable 


EYE CONTACT: Fair


PSYCHOMOTOR ACTIVITY: No psychomotor agitation or retardation. 


MOVEMENTS:  Bilateral upper extremity tremor 


SPEECH : coherent normal volume 


MOOD : "Angry"


AFFECT : 


Type is irritable and angry. 


Range is restricted 


Mood  Incongruent


THOUGHT PROCESS:  Formulated and organized in a logical, linear goal directed 

manner.   


No flight of ideas,  neologism (made up words) , perseveration , tangential , 

loose associations , or circumstantiality.


THOUGHT CONTENT: no delusions, obsessions, phobias or preoccupations.


PERCEPTION: No current auditory or visual hallucinations. Doesnt appear to be 

responding to internal cues. No evidence of depersonalization , de-realization, 

or illusions


SUICIDALITY     suicidal ideation with plan to end life with gun


HOMICIDALITY  Denied homicidal ideation, intent or plan.


Insight/judgment: Poor insight and judgment


ORIENTATION: Oriented to self, location, and time.





Diagnosis on Admission: Major depressive disorder. severe. 





Assessment:   54 year old  with history of depression  came to the 

hospital with suicidal ideation and plan to shoot himself and was admitted to 

the BSU at [facility]. 


Plan 





#Admit to BSU, Constant observation.  Start regular diet. Encourage 

participation in activities on the milieu.  


# Justification for Admission: For immediate safety per outlined in the [place] 

[place] Mental Hygiene Code. 


# The patient requires psychiatric inpatient admission at this time to assure 

safety, receive treatment and work toward stabilization.


# Labs ordered:  CBC, CMP, UDS, TSH, HBA1c, TSH, Toxicology screen, Urine 

analysis, and lipid profile.  


# Obtained collateral information from his brother Nader


# DNR status-  His brother and also his healthcare proxy are in agreement with 

DNR status and both wish to pursue hospice care upon discharge. 


# Collaboration with Social Work


#Constant Observation 


#Order for Oxygen compressor and Wheelchair 


# Firearms removed by  


# Hospital consult placed with plan to assist with MOLST form and oncology/ and 

palliative care resources 


# Safe ACT completed 


# Ethics consult completed with Nader Ruben 


# Patient has capacity to decide code status and at this time his  wishes 

include to be DNR code status. 


# Although patient has depression, at this time his judgment is not impaired in 

a way that does not interfere with his capacity to decide his medical 

decisions. 





#Goals before discharge include:  To eliminate/ reduce suicidal ideation





Tentative Discharge: Pending psychiatric stabilization





The risks, benefits, and alternative treatment options were discussed as well 

as the risks of refusing treatment.  After this discussion and an 

acknowledgement of this understanding was made.  A risk/ benefit assessment of 

treatment was considered and discussed with the patient. When comparing the 

risks of treatment with the dangers of not receiving treatment, the benefits of 

treatment outweigh the treatment risks at this time. Risks of allergy, suicidal 

ideation, behavioral changes, dystonia, rashes, electrolyte imbalances, 

movement disorders, cardiac conduction changes, serotonin syndrome, metabolic 

risks  were among some of the risks discussed. 











Acetaminophen (Tylenol Tab*)  650 mg PO Q4H PRN


   PRN Reason: for pain; or Temp >101 F


   Last Admin: 01/09/20 11:44 Dose:  650 mg


Al Hydrox/Mg Hydrox/Simethicone (Maalox Plus*)  30 ml PO Q4H PRN


   PRN Reason: INDIGESTION


Albuterol (Ventolin Hfa Inhaler*)  1 puff INH Q6HR PRN


   PRN Reason: SHORTNESS OF BREATH


Albuterol/Ipratropium (Duoneb (Albuterol 2.5 Mg/Ipratropium 0.5 Mg))  1 neb INH 

Q4H PRN


   PRN Reason: SHORTNESS OF BREATH


   Last Admin: 01/09/20 12:13 Dose:  1 neb


Alprazolam (Xanax Tab*)  1 mg PO TID PRN


   PRN Reason: ANXIETY


   Last Admin: 01/09/20 12:41 Dose:  1 mg


Baclofen (Lioresal  Tab*)  20 mg PO TID WITH MEALS [facility]


   Last Admin: 01/09/20 12:50 Dose:  20 mg


Citalopram Hydrobromide (Celexa Tab*)  40 mg PO DAILY [facility]


   Last Admin: 01/09/20 11:45 Dose:  40 mg


Diphenhydramine HCl (Benadryl Po*)  25 mg PO BEDTIME PRN


   PRN Reason: SLEEP


Epinephrine HCl (Adrenalin 1 Mg/Ml)  0.3 mg IM DAILY PRN


   PRN Reason: ALLERGY SYMPTOMS


Folic Acid (Folvite Tab*)  1 mg PO DAILY [facility]


Hydromorphone HCl (Dilaudid Tab*)  4 mg PO QID PRN


   PRN Reason: PAIN


   Last Admin: 01/09/20 12:41 Dose:  4 mg


Levofloxacin (Levaquin Tab*)  250 mg PO DAILY [facility]; Protocol


   Last Admin: 01/09/20 11:45 Dose:  250 mg


Miscellaneous (Ativan Pyxis Nuñez)  1 ea N/A .ATIVAN IV KEY PRN


   PRN Reason: PYXIS KEY


Mometasone Furoate/Formoterol Fumar (Dulera 100/5 Mdi*)  1 puff INH BID QUETA


Multivitamins/Minerals (Theragran/Minerals Tab*)  1 tab PO DAILY [facility]


   Last Admin: 01/09/20 11:44 Dose:  1 tab


Oxycodone HCl (Oxycontin(*))  20 mg PO Q12HR QUETA


Pantoprazole Sodium (Protonix Tab*)  40 mg PO DAILY [facility]


   Last Admin: 01/09/20 11:45 Dose:  40 mg


Zolpidem Tartrate (Ambien Tab*)  10 mg PO BEDTIME QUETA

## 2020-01-09 NOTE — ED
Progress





- Progress Note


Progress Note: 





Pt is a signout from Dr. Pina d/t  hold.











Re-Evaluation





- Re-Evaluation


  ** First Eval


Re-Evaluation Time: 07:00


Change: Unchanged


Comment: Pt is requesting something else for agitation. Will give Benadryl.





  ** Second Eval


Re-Evaluation Time: 05:53


Comment: Per Dr. Barker, pt will be admitted to [facility] with dx of 

unspecified depressive disorder.





Course/Dx





- Diagnoses


Provider Diagnoses: 


 Major depressive disorder, recurrent, unspecified








Discharge ED





- Sign-Out/Discharge


Documenting (check all that apply): Patient Departure, Receiving Sign-Out


Receiving patient FROM: Karli Pina





- Discharge Plan


Condition: Fair


Disposition: PSYCHIATRIC FACILITY-[facility]


Referrals: 


No Primary Care Phys,NOPCP [Primary Care Provider] - 





- Billing Disposition and Condition


Condition: FAIR


Disposition: Psychiatric Facility [facility]





- Attestation Statements


Document Initiated by Scribe: Yes


Documenting Scribe: Zulema Polo


Provider For Whom Sonia is Documenting (Include Credential): Bravo Guthrie MD.


Scribe Attestation: 


IZulema, scribed for Bravo Guthrie MD. on 01/09/20 at 1017. 


Scribe Documentation Reviewed: Yes


Provider Attestation: 


The documentation as recorded by the scribZulema santamaria accurately 

reflects the service I personally performed and the decisions made by me, 

Bravo Guthrie MD.


Status of Scribe Document: Viewed

## 2020-01-09 NOTE — PN
Hospitalist Progress Note


Date of Service: 01/09/20





Called for consult re code status and end of life decisions.





Mr. Sanchez is a 55 yo M with PMH of lung cancer, COPD on 4L NC, and MS; who 

presented to [facility] with suicidal ideations requiring admission to BSU. 





He reports wanting to be a DNR/DNI and is able to verbalize the meaning of 

those terms. He is also requesting comfort measure which he says he has spoke 

with his wife about. 





I filled out a MOLST form with the patient indicating DNR/DNI code status. This 

was placed in the chart. I advised him that he would need to speak further with 

his oncologist (Dr. Patton) regarding his wishes for comfort measures as it is 

unclear to me how advanced the disease is (most recent PET scan indicates no 

evidence of metastatic disease) and his chronic conditions are stable at this 

time, so there is no urgent need for initiation of comfort measures. He is 

agreeable to this plan. [facility] has contacted oncology for further 

assistance and input.





Thank you for this consultation. We will sign off at this time. Please do not 

hesitate to contact us for any other questions or concerns.

## 2020-01-10 LAB
CHOLEST SERPL-MCNC: 124 MG/DL
HDLC SERPL-MCNC: 25 MG/DL
TRIGL SERPL-MCNC: 122 MG/DL

## 2020-01-10 RX ADMIN — ZOLPIDEM TARTRATE SCH MG: 10 TABLET, FILM COATED ORAL at 22:33

## 2020-01-10 RX ADMIN — THERA TABS SCH: TAB at 12:43

## 2020-01-10 RX ADMIN — ALPRAZOLAM PRN MG: 0.5 TABLET ORAL at 07:52

## 2020-01-10 RX ADMIN — ACETAMINOPHEN PRN MG: 325 TABLET ORAL at 17:18

## 2020-01-10 RX ADMIN — FOLIC ACID SCH: 1 TABLET ORAL at 12:43

## 2020-01-10 RX ADMIN — MOMETASONE FUROATE AND FORMOTEROL FUMARATE DIHYDRATE SCH PUFF: 100; 5 AEROSOL RESPIRATORY (INHALATION) at 09:26

## 2020-01-10 RX ADMIN — BACLOFEN SCH MG: 20 TABLET ORAL at 15:03

## 2020-01-10 RX ADMIN — CITALOPRAM SCH: 40 TABLET ORAL at 14:56

## 2020-01-10 RX ADMIN — BACLOFEN SCH MG: 20 TABLET ORAL at 09:25

## 2020-01-10 RX ADMIN — IPRATROPIUM BROMIDE AND ALBUTEROL SULFATE PRN NEB: .5; 3 SOLUTION RESPIRATORY (INHALATION) at 10:40

## 2020-01-10 RX ADMIN — HYDROMORPHONE HYDROCHLORIDE PRN MG: 4 TABLET ORAL at 02:40

## 2020-01-10 RX ADMIN — OXYCODONE HYDROCHLORIDE SCH MG: 10 TABLET, FILM COATED, EXTENDED RELEASE ORAL at 07:43

## 2020-01-10 RX ADMIN — ALPRAZOLAM PRN MG: 0.5 TABLET ORAL at 02:30

## 2020-01-10 RX ADMIN — HYDROMORPHONE HYDROCHLORIDE PRN MG: 4 TABLET ORAL at 09:25

## 2020-01-10 RX ADMIN — BACLOFEN SCH MG: 20 TABLET ORAL at 22:31

## 2020-01-10 RX ADMIN — PANTOPRAZOLE SODIUM SCH: 40 TABLET, DELAYED RELEASE ORAL at 12:43

## 2020-01-10 RX ADMIN — ALPRAZOLAM PRN MG: 0.5 TABLET ORAL at 17:19

## 2020-01-10 RX ADMIN — MOMETASONE FUROATE AND FORMOTEROL FUMARATE DIHYDRATE SCH PUFF: 100; 5 AEROSOL RESPIRATORY (INHALATION) at 22:32

## 2020-01-10 RX ADMIN — ACETAMINOPHEN PRN MG: 325 TABLET ORAL at 04:27

## 2020-01-10 RX ADMIN — HYDROMORPHONE HYDROCHLORIDE PRN MG: 4 TABLET ORAL at 15:02

## 2020-01-10 RX ADMIN — OXYCODONE HYDROCHLORIDE SCH MG: 10 TABLET, FILM COATED, EXTENDED RELEASE ORAL at 22:32

## 2020-01-10 RX ADMIN — BACLOFEN SCH MG: 20 TABLET ORAL at 17:18

## 2020-01-10 RX ADMIN — LEVOFLOXACIN SCH: 250 TABLET, FILM COATED ORAL at 14:56

## 2020-01-10 RX ADMIN — BACLOFEN SCH: 20 TABLET ORAL at 19:39

## 2020-01-10 NOTE — PN
Subjective





- Subjective


Date of Service: 01/10/20


Service Type: 71264 Hosp care 35 min high complexity


Subjective: 





Nursing Report: Patient was visible on unit, no behavioral incidents. Slept 

overnight. Patient is mostly in his room with constant observation. 





CC: "I am in pain"





Patient was seen and evaluated today he complains of a increase in anxiety.  

Patient reported increased lethargy and low energy and wants to change that. 

Patient reported being miserable and in constant pain. 


He reported having  adequate appetite and sleep. Per nursing no behavioral 

issues or overnight events reported. Patient reported that he is tolerating 

medications without side effects.  





Objective





- General Observations


Appearance: Disheveled


Appears Stated Age: Yes


Stature: Overweight


Posture: Tense


Eye Contact: Intense


Behavior/Activity: Peculiar





- Interaction Observations


Attitude Towards Examiner: Anxious, Defensive


Stated Mood: Dysphoric


Affect: Restricted


Speech Pattern/Tone: Clear


Thought Process: Goal Directed


Perception: WNL


Thought Content: WNL


Hallucination Type: None


Delusion Type: None





- Cognitive Function


Orientation: A&O x 4


Level of Consciousness: Awake





- Medication Compliance


Cooperative with Inpatient Medication Regimen: Yes





- Group Participation


Participates in Group Activities: No





Assessment





- Assessment


Merits Inpatient Hospitalization: For Immediate Safety





Plan





- Plan


Treatment Plan: 


Name: DAXA GOMES                        


YOB: 1965                        


[id number]


[id number]














# The patient requires psychiatric inpatient admission at this time to assure 

safety, receive treatment and work toward stabilization.


# Obtained collateral information from his brother Nader


# DNR status


# Collaboration with Social Work


#Constant Observation 


#Order for Oxygen compressor and Wheelchair and reclining chair 


# Firearms removed by  


# Hospital consult placed with plan to assist with MOLST form and oncology/ and 

palliative care resources 


# Safe ACT completed 


# Increased xanax to 2mg TID PRN 


#Started methylphenidate 18mg daily for cancer related depression and lethargy


# Palliative care referral completed by hospitalist social work 


#Hospitalist evaluated patient for the need to be transferred to the medical 

service and determined that he did not need to be transferred  


# Ethics consult completed with Nader Miranda 


# Patient has capacity to decide code status and at this time his  wishes 

include to be DNR code status. 


# Although patient has depression, at this time his judgment is not impaired in 

a way that does not interfere with his capacity to decide his medical 

decisions. 





#Goals before discharge include:  To eliminate/ reduce suicidal ideation





Tentative Discharge: Monday





 











ABG pH  7.41  (7.35-7.45)   01/09/20  01:10    


 


ABG HCO3  27.4 mmol/L (19-31)   01/09/20  01:10    


 


Sodium  139 mmol/L (135-145)   01/08/20  23:35    


 


Potassium  3.9 mmol/L (3.5-5.0)   01/08/20  23:35    


 


BUN  12 mg/dL (6-24)   01/08/20  23:35    


 


Creatinine  0.86 mg/dL (0.67-1.17)   01/08/20  23:35    


 


Hemoglobin A1c  5.4 % (4.0-5.6)   01/10/20  07:42    


 


Calcium  8.5 mg/dL (8.6-10.3)  L  01/08/20  23:35    


 


AST  30 U/L (13-39)   01/08/20  23:35    


 


ALT  38 U/L (7-52)   01/08/20  23:35    


 


Triglycerides  122 mg/dL  01/10/20  07:42    


 


Cholesterol  124 mg/dL  01/10/20  07:42    


 


LDL Cholesterol  75 mg/dL  01/10/20  07:42    











Continued Medication Management: Continue Outpt Medication


Medications: 


 Current Medications





Acetaminophen (Tylenol Tab*)  650 mg PO Q4H PRN


   PRN Reason: for pain; or Temp >101 F


   Last Admin: 01/10/20 04:27 Dose:  650 mg


Al Hydrox/Mg Hydrox/Simethicone (Maalox Plus*)  30 ml PO Q4H PRN


   PRN Reason: INDIGESTION


Albuterol (Ventolin Hfa Inhaler*)  1 puff INH Q6HR PRN


   PRN Reason: SHORTNESS OF BREATH


   Last Admin: 01/10/20 09:25 Dose:  1 puff


Albuterol/Ipratropium (Duoneb (Albuterol 2.5 Mg/Ipratropium 0.5 Mg))  1 neb INH 

Q4H PRN


   PRN Reason: SHORTNESS OF BREATH


   Last Admin: 01/10/20 10:40 Dose:  1 neb


Alprazolam (Xanax Tab*)  2 mg PO TID PRN


   PRN Reason: ANXIETY


Baclofen (Lioresal  Tab*)  20 mg PO TID WITH MEALS QUETA


   Last Admin: 01/10/20 09:25 Dose:  20 mg


Citalopram Hydrobromide (Celexa Tab*)  40 mg PO DAILY [facility]


   Last Admin: 01/09/20 11:45 Dose:  40 mg


Diphenhydramine HCl (Benadryl Po*)  25 mg PO BEDTIME PRN


   PRN Reason: SLEEP


Epinephrine HCl (Adrenalin 1 Mg/Ml)  0.3 mg IM DAILY PRN


   PRN Reason: ALLERGY SYMPTOMS


Folic Acid (Folvite Tab*)  1 mg PO DAILY [facility]


   Last Admin: 01/10/20 12:43 Dose:  Not Given


Hydromorphone HCl (Dilaudid Tab*)  4 mg PO QID PRN


   PRN Reason: PAIN


   Last Admin: 01/10/20 09:25 Dose:  4 mg


Levofloxacin (Levaquin Tab*)  250 mg PO DAILY [facility]; Protocol


   Last Admin: 01/09/20 11:45 Dose:  250 mg


Methylphenidate HCl (Concerta Er Tab*)  18 mg PO DAILY [facility]


Miscellaneous (Ativan Pyxis Nuñez)  1 ea N/A .ATIVAN IV KEY PRN


   PRN Reason: PYXIS KEY


Mometasone Furoate/Formoterol Fumar (Dulera 100/5 Mdi*)  1 puff INH BID [facility]


   Last Admin: 01/10/20 09:26 Dose:  1 puff


Multivitamins/Minerals (Theragran/Minerals Tab*)  1 tab PO DAILY [facility]


   Last Admin: 01/10/20 12:43 Dose:  Not Given


Oxycodone HCl (Oxycontin(*))  20 mg PO Q12HR [facility]


   Last Admin: 01/10/20 07:43 Dose:  20 mg


Pantoprazole Sodium (Protonix Tab*)  40 mg PO DAILY [facility]


   Last Admin: 01/10/20 12:43 Dose:  Not Given


Zolpidem Tartrate (Ambien Tab*)  10 mg PO BEDTIME [facility]


   Last Admin: 01/09/20 20:44 Dose:  10 mg











- Discharge Plan


Discharge Plan: Inpatient Hospitalization

## 2020-01-10 NOTE — PN
Hospitalist Progress Note


Date of Service: 01/10/20





Called by Psychiatrist to reevaluate patient. 





S:


Mr. Sanchez is not feeling well today. He is frustrated about his medical 

conditions. Anxiety and anger are easily provoked. At one point he indicates 

wanting comfort measures only but then talks about wanting to improve his 

health so that he is at a point where his symptoms are manageable on a day-to-

day basis. He is complaining of right-sided spams in his lower ribs/flank. 

Agreeable to a palliative care consult.





O:


Lying in bed in NAD. Respirations slightly labored, but no dyspnea. Lung have 

scattered rhonchi. Saturating well on baseline 4L. HRR. 





A:


Mr. Sanchez is an unfortunate 55 yo M with PMH of stage 3 lung cancer in 

remission s/p chemo and radiation, COPD with chronic respiratory failure on 4L, 

MS; admitted to [facility] with suicidal ideations. 





P:


1. Lung cancer: According to recent Pulmonology note (09/2019), cancer is in 

remission. Recent PET scan around that time is unremarkable for any evidence of 

metasteses. This does not appear to be an active medical concern at this time.


2. COPD on 4L: He is not in acute exacerbation and is sating well on baseline 

oxygen requirements. He has not had any exacerbations requiring 

hospitalization. Disease is certainly advanced, but not acutely concerning. 

Could consider adding buspirone for anxiety. 


3. MS: He admits he has not seen a neurologist in quite some time. He is c/o 

spasms, so I will increase his baclofen from 20mg TID to 20mg QID. He is 

already on high doses of Oxycontin and hydromorphone. He would certainly 

benefit from f/u with his neurologist and possibly referral to the pain clinic 

if pain is still uncontrolled.


4. Code status: DNR/DNI.





I have talked with our social workers and they are going to send out a referral 

to [facility] for outpatient palliative care services. I did talk with the patient 

about a palliative care consult here in the hospital, but unfortunately that 

would not happen until Mon or Tues. I do not think he needs an inpatient 

consult at this point, and so I think he could be d/c'd home with outpatient 

resources when Psych feels he is safe for d/c.

## 2020-01-11 LAB
ANION GAP SERPL CALC-SCNC: 9 MMOL/L (ref 2–11)
BASOPHILS # BLD AUTO: 0 10^3/UL (ref 0–0.2)
BUN SERPL-MCNC: 7 MG/DL (ref 6–24)
BUN/CREAT SERPL: 9.2 (ref 8–20)
CALCIUM SERPL-MCNC: 8.5 MG/DL (ref 8.6–10.3)
CHLORIDE SERPL-SCNC: 103 MMOL/L (ref 101–111)
EOSINOPHIL # BLD AUTO: 0.2 10^3/UL (ref 0–0.6)
GLUCOSE SERPL-MCNC: 103 MG/DL (ref 70–100)
HCO3 SERPL-SCNC: 26 MMOL/L (ref 22–32)
HCT VFR BLD AUTO: 36 % (ref 42–52)
HGB BLD-MCNC: 12.1 G/DL (ref 14–18)
LYMPHOCYTES # BLD AUTO: 0.6 10^3/UL (ref 1–4.8)
MCH RBC QN AUTO: 30 PG (ref 27–31)
MCHC RBC AUTO-ENTMCNC: 34 G/DL (ref 31–36)
MCV RBC AUTO: 89 FL (ref 80–94)
MONOCYTES # BLD AUTO: 0.5 10^3/UL (ref 0–0.8)
NEUTROPHILS # BLD AUTO: 5.1 10^3/UL (ref 1.5–7.7)
NRBC # BLD AUTO: 0 10^3/UL
NRBC BLD QL AUTO: 0
PLATELET # BLD AUTO: 315 10^3/UL (ref 150–450)
POTASSIUM SERPL-SCNC: 3.5 MMOL/L (ref 3.5–5)
RBC # BLD AUTO: 4.01 10^6 /UL (ref 4.18–5.48)
SODIUM SERPL-SCNC: 138 MMOL/L (ref 135–145)
WBC # BLD AUTO: 6.4 10^3/UL (ref 3.5–10.8)

## 2020-01-11 RX ADMIN — ALPRAZOLAM PRN MG: 0.5 TABLET ORAL at 11:36

## 2020-01-11 RX ADMIN — OXYCODONE HYDROCHLORIDE SCH MG: 10 TABLET, FILM COATED, EXTENDED RELEASE ORAL at 22:28

## 2020-01-11 RX ADMIN — ACETAMINOPHEN PRN MG: 325 TABLET ORAL at 10:09

## 2020-01-11 RX ADMIN — IPRATROPIUM BROMIDE AND ALBUTEROL SULFATE PRN NEB: .5; 3 SOLUTION RESPIRATORY (INHALATION) at 10:41

## 2020-01-11 RX ADMIN — CEFDINIR SCH MG: 300 CAPSULE ORAL at 13:12

## 2020-01-11 RX ADMIN — ZOLPIDEM TARTRATE SCH MG: 10 TABLET, FILM COATED ORAL at 22:30

## 2020-01-11 RX ADMIN — BACLOFEN SCH MG: 20 TABLET ORAL at 09:51

## 2020-01-11 RX ADMIN — ACETAMINOPHEN PRN MG: 325 TABLET ORAL at 16:15

## 2020-01-11 RX ADMIN — HYDROMORPHONE HYDROCHLORIDE PRN MG: 4 TABLET ORAL at 09:53

## 2020-01-11 RX ADMIN — HYDROMORPHONE HYDROCHLORIDE PRN MG: 4 TABLET ORAL at 16:14

## 2020-01-11 RX ADMIN — CEFDINIR SCH MG: 300 CAPSULE ORAL at 22:31

## 2020-01-11 RX ADMIN — MOMETASONE FUROATE AND FORMOTEROL FUMARATE DIHYDRATE SCH PUFF: 100; 5 AEROSOL RESPIRATORY (INHALATION) at 22:31

## 2020-01-11 RX ADMIN — CITALOPRAM SCH MG: 40 TABLET ORAL at 10:01

## 2020-01-11 RX ADMIN — BACLOFEN SCH MG: 20 TABLET ORAL at 22:28

## 2020-01-11 RX ADMIN — LEVOFLOXACIN SCH MG: 250 TABLET, FILM COATED ORAL at 10:01

## 2020-01-11 RX ADMIN — OXYCODONE HYDROCHLORIDE SCH MG: 10 TABLET, FILM COATED, EXTENDED RELEASE ORAL at 09:50

## 2020-01-11 RX ADMIN — HYDROMORPHONE HYDROCHLORIDE PRN MG: 4 TABLET ORAL at 03:40

## 2020-01-11 RX ADMIN — PANTOPRAZOLE SODIUM SCH MG: 40 TABLET, DELAYED RELEASE ORAL at 10:02

## 2020-01-11 RX ADMIN — THERA TABS SCH TAB: TAB at 10:02

## 2020-01-11 RX ADMIN — BACLOFEN SCH MG: 20 TABLET ORAL at 18:03

## 2020-01-11 RX ADMIN — MOMETASONE FUROATE AND FORMOTEROL FUMARATE DIHYDRATE SCH PUFF: 100; 5 AEROSOL RESPIRATORY (INHALATION) at 10:17

## 2020-01-11 RX ADMIN — FOLIC ACID SCH MG: 1 TABLET ORAL at 10:01

## 2020-01-11 RX ADMIN — METHYLPHENIDATE HYDROCHLORIDE SCH MG: 18 TABLET, EXTENDED RELEASE ORAL at 10:01

## 2020-01-11 RX ADMIN — BACLOFEN SCH MG: 20 TABLET ORAL at 13:12
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
## 2020-01-12 RX ADMIN — CEFDINIR SCH MG: 300 CAPSULE ORAL at 21:36

## 2020-01-12 RX ADMIN — ALPRAZOLAM PRN MG: 0.5 TABLET ORAL at 19:50

## 2020-01-12 RX ADMIN — BACLOFEN SCH MG: 20 TABLET ORAL at 16:53

## 2020-01-12 RX ADMIN — METHYLPHENIDATE HYDROCHLORIDE SCH MG: 18 TABLET, EXTENDED RELEASE ORAL at 09:13

## 2020-01-12 RX ADMIN — OXYCODONE HYDROCHLORIDE SCH MG: 10 TABLET, FILM COATED, EXTENDED RELEASE ORAL at 09:16

## 2020-01-12 RX ADMIN — CEFDINIR SCH MG: 300 CAPSULE ORAL at 09:13

## 2020-01-12 RX ADMIN — HYDROMORPHONE HYDROCHLORIDE PRN MG: 4 TABLET ORAL at 19:50

## 2020-01-12 RX ADMIN — CITALOPRAM SCH MG: 40 TABLET ORAL at 09:13

## 2020-01-12 RX ADMIN — GUAIFENESIN SCH MG: 600 TABLET, EXTENDED RELEASE ORAL at 21:37

## 2020-01-12 RX ADMIN — ALPRAZOLAM PRN MG: 0.5 TABLET ORAL at 16:56

## 2020-01-12 RX ADMIN — FOLIC ACID SCH MG: 1 TABLET ORAL at 09:12

## 2020-01-12 RX ADMIN — THERA TABS SCH TAB: TAB at 09:13

## 2020-01-12 RX ADMIN — ALPRAZOLAM PRN MG: 0.5 TABLET ORAL at 11:04

## 2020-01-12 RX ADMIN — HYDROMORPHONE HYDROCHLORIDE PRN MG: 4 TABLET ORAL at 09:12

## 2020-01-12 RX ADMIN — HYDROMORPHONE HYDROCHLORIDE PRN MG: 4 TABLET ORAL at 16:57

## 2020-01-12 RX ADMIN — ZOLPIDEM TARTRATE SCH MG: 10 TABLET, FILM COATED ORAL at 21:36

## 2020-01-12 RX ADMIN — BACLOFEN SCH MG: 20 TABLET ORAL at 09:13

## 2020-01-12 RX ADMIN — ACETAMINOPHEN PRN MG: 325 TABLET ORAL at 19:50

## 2020-01-12 RX ADMIN — HYDROMORPHONE HYDROCHLORIDE PRN MG: 4 TABLET ORAL at 03:26

## 2020-01-12 RX ADMIN — MOMETASONE FUROATE AND FORMOTEROL FUMARATE DIHYDRATE SCH PUFF: 100; 5 AEROSOL RESPIRATORY (INHALATION) at 21:37

## 2020-01-12 RX ADMIN — AZITHROMYCIN SCH MG: 250 TABLET, FILM COATED ORAL at 09:13

## 2020-01-12 RX ADMIN — OXYCODONE HYDROCHLORIDE SCH MG: 10 TABLET, FILM COATED, EXTENDED RELEASE ORAL at 21:37

## 2020-01-12 RX ADMIN — BACLOFEN SCH MG: 20 TABLET ORAL at 12:05

## 2020-01-12 RX ADMIN — PANTOPRAZOLE SODIUM SCH MG: 40 TABLET, DELAYED RELEASE ORAL at 09:13

## 2020-01-12 RX ADMIN — BACLOFEN SCH MG: 20 TABLET ORAL at 21:36

## 2020-01-12 RX ADMIN — MOMETASONE FUROATE AND FORMOTEROL FUMARATE DIHYDRATE SCH PUFF: 100; 5 AEROSOL RESPIRATORY (INHALATION) at 09:14

## 2020-01-12 RX ADMIN — ACETAMINOPHEN PRN MG: 325 TABLET ORAL at 12:05
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
## 2020-01-12 NOTE — PN
Subjective


Date of Service: 01/12/20


Interval History: 





Patient reports that last night his left ankle was bothering him and he was 

unable to move foot.  states that after his ankle popped he has had not further 

issues.  Patient reports he does, minimal activity at baseline.  





Today reports that he is frustrated about having pneumonia again.  reports that 

he has right rib pain with cough and movement , worse with deep breath.  

Requesting new oxygen tubing, as current oxygen tubing is bothering wear it 

rests on his ears.  


Denies worsening shortness of breath.  Denies fever or chills overnight.  

Denies abd pain n/v or diarrhea.  


Family History: Unchanged from Admission


Social History: Unchanged from Admission


Past Medical History: Unchanged from Admission





Objective


Active Medications: 








Acetaminophen (Tylenol Tab*)  650 mg PO Q4H PRN


   PRN Reason: for pain; or Temp >101 F


   Last Admin: 01/11/20 16:15 Dose:  650 mg


Al Hydrox/Mg Hydrox/Simethicone (Maalox Plus*)  30 ml PO Q4H PRN


   PRN Reason: INDIGESTION


Albuterol (Ventolin Hfa Inhaler*)  1 puff INH Q6HR PRN


   PRN Reason: SHORTNESS OF BREATH


   Last Admin: 01/10/20 09:25 Dose:  1 puff


Albuterol/Ipratropium (Duoneb (Albuterol 2.5 Mg/Ipratropium 0.5 Mg))  1 neb INH 

Q4H PRN


   PRN Reason: SHORTNESS OF BREATH


   Last Admin: 01/11/20 10:41 Dose:  1 neb


Alprazolam (Xanax Tab*)  2 mg PO TID PRN


   PRN Reason: ANXIETY


   Last Admin: 01/12/20 11:04 Dose:  2 mg


Azithromycin (Zithromax Tab*)  250 mg PO DAILY [facility]


   Stop: 01/15/20 09:01


   Last Admin: 01/12/20 09:13 Dose:  250 mg


Baclofen (Lioresal  Tab*)  20 mg PO QID [facility]


   Last Admin: 01/12/20 09:13 Dose:  20 mg


Cefdinir (Cefdinir Cap*)  300 mg PO BID [facility]


   Stop: 01/18/20 12:59


   Last Admin: 01/12/20 09:13 Dose:  300 mg


Citalopram Hydrobromide (Celexa Tab*)  40 mg PO DAILY [facility]


   Last Admin: 01/12/20 09:13 Dose:  40 mg


Diphenhydramine HCl (Benadryl Po*)  25 mg PO BEDTIME PRN


   PRN Reason: SLEEP


Epinephrine HCl (Adrenalin 1 Mg/Ml)  0.3 mg IM DAILY PRN


   PRN Reason: ALLERGY SYMPTOMS


Folic Acid (Folvite Tab*)  1 mg PO DAILY [facility]


   Last Admin: 01/12/20 09:12 Dose:  1 mg


Hydromorphone HCl (Dilaudid Tab*)  4 mg PO QID PRN


   PRN Reason: PAIN


   Last Admin: 01/12/20 09:12 Dose:  4 mg


Methylphenidate HCl (Concerta Er Tab*)  18 mg PO DAILY [facility]


   Last Admin: 01/12/20 09:13 Dose:  18 mg


Miscellaneous (Ativan Pyxis Nuñez)  1 ea N/A .ATIVAN IV KEY PRN


   PRN Reason: PYXIS KEY


Mometasone Furoate/Formoterol Fumar (Dulera 100/5 Mdi*)  1 puff INH BID [facility]


   Last Admin: 01/12/20 09:14 Dose:  1 puff


Multivitamins/Minerals (Theragran/Minerals Tab*)  1 tab PO DAILY [facility]


   Last Admin: 01/12/20 09:13 Dose:  1 tab


Oxycodone HCl (Oxycontin(*))  20 mg PO Q12HR [facility]


   Last Admin: 01/12/20 09:16 Dose:  20 mg


Pantoprazole Sodium (Protonix Tab*)  40 mg PO DAILY [facility]


   Last Admin: 01/12/20 09:13 Dose:  40 mg


Zolpidem Tartrate (Ambien Tab*)  10 mg PO BEDTIME [facility]


   Last Admin: 01/11/20 22:30 Dose:  10 mg








 Vital Signs - 8 hr











  01/12/20 01/12/20 01/12/20





  09:12 09:16 11:04


 


Respiratory 17 19 16





Rate   











Oxygen Devices in Use Now: Nasal Cannula - 4L


Appearance: appears comfortable resting in bed , no acute distress


Eyes: No Scleral Icterus


Ears/Nose/Mouth/Throat: Clear Oropharnyx, Mucous Membranes Moist


Neck: NL Appearance and Movements; NL JVP, Trachea Midline


Respiratory: Symmetrical Chest Expansion and Respiratory Effort, - - exp 

wheezes bilat, diminshed breath sounds 


Cardiovascular: NL Sounds; No Murmurs; No JVD, No Edema


Abdominal: NL Sounds; No Tenderness; No Distention


Extremities: No Edema, No Clubbing, Cyanosis


Skin: No Rash or Ulcers


Neurological: Alert and Oriented x 3


Nutrition: Taking PO's


Result Diagrams: 


 01/11/20 12:03





 01/11/20 12:03





Assess/Plan/Problems-Billing





Assessment: 





Mr. Sanchez is a 55 yo M with PMH of COPD on 4L, MS, and stage III lung cancer in 

remission; who presented to the ED with suicidal ideations requiring MHU 

admission. Hospital Medicine has been asked to consult, initially regarding 

code status and now d/t SOB.





- Patient Problems


(1) Pneumonia


Current Visit: Yes   Status: Acute   Code(s): J18.9 - PNEUMONIA, UNSPECIFIED 

ORGANISM   SNOMED Code(s): 157424733


   Comment: 


- C/o mild SOB and general malaise beginning this morning


- Repeat CXR today shows developing RUL consolidation, new from admission


- Recently hospitalized at [place] for pneumonia requiring intubation, still 

finishing course of Levaquin


- CURB-65 score is 0 and patient is only very mildly tachycardic, so inpatient 

admission is not necessary


- D/c Levaquin


- continue cefdinir ( will need 4 more days at discharge 1/13/2020), nebs and 

inhalers


- will change azithromycin to doxycycline - he will need 4 more days at 

discharge (1/13/2020) 


   





(2) Suicidal ideations


Current Visit: Yes   Status: Acute   Code(s): R45.851 - SUICIDAL IDEATIONS   

SNOMED Code(s): 1193995


   Comment: 


- Management per Psych   





(3) COPD (chronic obstructive pulmonary disease)


Current Visit: Yes   Status: Acute   Code(s): J44.9 - CHRONIC OBSTRUCTIVE 

PULMONARY DISEASE, UNSPECIFIED   SNOMED Code(s): 42369907


   Comment: 


- No evidence of exacerbation


- At baseline oxygen requirment of 4L


- Goal O2 sat 88-92%


- Continue Dulera, Duonebs   





(4) Chronic pain


Current Visit: Yes   Status: Acute   Code(s): G89.29 - OTHER CHRONIC PAIN   

SNOMED Code(s): 14694977


   Comment: 


- Follows with pain clinic


- Continue Oxycontin, hydromorphone   





(5) Lung cancer


Current Visit: Yes   Status: Acute   Code(s): C34.90 - MALIGNANT NEOPLASM OF 

UNSP PART OF UNSP BRONCHUS OR LUNG   SNOMED Code(s): 316836433


   Comment: 


- Outpatient notes indicate in remission s/p chemo and radiation   





(6) Multiple sclerosis


Current Visit: Yes   Status: Acute   Code(s): G35 - MULTIPLE SCLEROSIS   SNOMED 

Code(s): 59451478


   Comment: 


- Has not seen a neurologist in quite some time and would greatly benefit from 

outpatient follow up


- Continue baclofen   





(7) DNR (do not resuscitate)


Current Visit: Yes   Status: Acute   Comment: 


   


Status and Disposition: 





Dispo per Psych.





Social work has already made a referral for outpatient palliative care.





Thank you for this consultation. We will follow along in his care.

## 2020-01-13 RX ADMIN — CITALOPRAM SCH MG: 40 TABLET ORAL at 09:15

## 2020-01-13 RX ADMIN — ALPRAZOLAM PRN MG: 0.5 TABLET ORAL at 13:41

## 2020-01-13 RX ADMIN — GUAIFENESIN SCH MG: 600 TABLET, EXTENDED RELEASE ORAL at 20:38

## 2020-01-13 RX ADMIN — BACLOFEN SCH MG: 20 TABLET ORAL at 13:13

## 2020-01-13 RX ADMIN — CEFDINIR SCH MG: 300 CAPSULE ORAL at 09:16

## 2020-01-13 RX ADMIN — ALPRAZOLAM PRN MG: 0.5 TABLET ORAL at 11:34

## 2020-01-13 RX ADMIN — BACLOFEN SCH MG: 20 TABLET ORAL at 17:10

## 2020-01-13 RX ADMIN — ACETAMINOPHEN PRN MG: 325 TABLET ORAL at 13:41

## 2020-01-13 RX ADMIN — ZOLPIDEM TARTRATE SCH MG: 10 TABLET, FILM COATED ORAL at 20:39

## 2020-01-13 RX ADMIN — FOLIC ACID SCH MG: 1 TABLET ORAL at 09:16

## 2020-01-13 RX ADMIN — HYDROMORPHONE HYDROCHLORIDE PRN MG: 4 TABLET ORAL at 14:47

## 2020-01-13 RX ADMIN — OXYCODONE HYDROCHLORIDE SCH MG: 10 TABLET, FILM COATED, EXTENDED RELEASE ORAL at 09:15

## 2020-01-13 RX ADMIN — GUAIFENESIN SCH MG: 600 TABLET, EXTENDED RELEASE ORAL at 09:15

## 2020-01-13 RX ADMIN — METHYLPHENIDATE HYDROCHLORIDE SCH MG: 18 TABLET, EXTENDED RELEASE ORAL at 09:16

## 2020-01-13 RX ADMIN — BACLOFEN SCH MG: 20 TABLET ORAL at 09:16

## 2020-01-13 RX ADMIN — MOMETASONE FUROATE AND FORMOTEROL FUMARATE DIHYDRATE SCH PUFF: 100; 5 AEROSOL RESPIRATORY (INHALATION) at 20:39

## 2020-01-13 RX ADMIN — OXYCODONE HYDROCHLORIDE SCH MG: 10 TABLET, FILM COATED, EXTENDED RELEASE ORAL at 20:38

## 2020-01-13 RX ADMIN — HYDROMORPHONE HYDROCHLORIDE PRN MG: 4 TABLET ORAL at 11:34

## 2020-01-13 RX ADMIN — PANTOPRAZOLE SODIUM SCH MG: 40 TABLET, DELAYED RELEASE ORAL at 09:15

## 2020-01-13 RX ADMIN — MOMETASONE FUROATE AND FORMOTEROL FUMARATE DIHYDRATE SCH PUFF: 100; 5 AEROSOL RESPIRATORY (INHALATION) at 09:17

## 2020-01-13 RX ADMIN — BACLOFEN SCH MG: 20 TABLET ORAL at 20:39

## 2020-01-13 RX ADMIN — THERA TABS SCH TAB: TAB at 09:16

## 2020-01-13 RX ADMIN — AZITHROMYCIN SCH MG: 250 TABLET, FILM COATED ORAL at 09:15

## 2020-01-13 RX ADMIN — CEFDINIR SCH MG: 300 CAPSULE ORAL at 20:39

## 2020-01-13 NOTE — CONSULT
Palliative / Hospice Consult


Ordering Provider: Dina Granger - PCP-Peña


Referal Reason: 


Goals of care, pt requesting hospice/no bowel meds/oxycodone and hyromorphone





- Subjective


Code Status: DNR


Advance Directives Location: In Chart


MOLST Part A Completed: Yes - on chart


MOLST Part E Completed:: Yes - on chart





- History or Present Illness


History or Present Illness: 


55yo male was presented to ER with suicide ideation. PMH is significant for 

lung cancer stage 3 in remission s/p chemo & radiation, COPD on 4 liters O2 and 

multiple sclerosis. 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 Studies CXR#1 density RUL improved aeration from 

previous study, CXR #2 RUL increased consolidation ? pneumonia, H/H 12.1/36, BUN

/Cr 7/.76, egfr 106.5, Ca 8.5 and alb 3.2. Pt admitted because danger to himself

, hospitalist consulted for medical management. All history form pt and medical 

record.


Lab Values: 


 Laboratory Last Values











WBC  6.4 10^3/uL (3.5-10.8)   01/11/20  12:03    


 


RBC  4.01 10^6 /uL (4.18-5.48)  L  01/11/20  12:03    


 


Hgb  12.1 g/dL (14.0-18.0)  L  01/11/20  12:03    


 


Hct  36 % (42-52)  L  01/11/20  12:03    


 


MCV  89 fL (80-94)   01/11/20  12:03    


 


MCH  30 pg (27-31)   01/11/20  12:03    


 


MCHC  34 g/dL (31-36)   01/11/20  12:03    


 


RDW  14 % (10-15)   01/11/20  12:03    


 


Plt Count  315 10^3/uL (150-450)   01/11/20  12:03    


 


MPV  8.4 fL (7.4-10.4)   01/11/20  12:03    


 


Neut % (Auto)  79.9 %  01/11/20  12:03    


 


Lymph % (Auto)  9.7 %  01/11/20  12:03    


 


Mono % (Auto)  7.5 %  01/11/20  12:03    


 


Eos % (Auto)  2.4 %  01/11/20  12:03    


 


Baso % (Auto)  0.5 %  01/11/20  12:03    


 


Absolute Neuts (auto)  5.1 10^3/ul (1.5-7.7)   01/11/20  12:03    


 


Absolute Lymphs (auto)  0.6 10^3/ul (1.0-4.8)  L  01/11/20  12:03    


 


Absolute Monos (auto)  0.5 10^3/ul (0-0.8)   01/11/20  12:03    


 


Absolute Eos (auto)  0.2 10^3/ul (0-0.6)   01/11/20  12:03    


 


Absolute Basos (auto)  0.0 10^3/ul (0-0.2)   01/11/20  12:03    


 


Absolute Nucleated RBC  0.0 10^3/ul  01/11/20  12:03    


 


Nucleated RBC %  0.0   01/11/20  12:03    


 


Patient Temperature  Not Reportable   01/09/20  01:10    


 


ABG pH  7.41  (7.35-7.45)   01/09/20  01:10    


 


ABG pH (Temp Correct)  Not Reportable   01/09/20  01:10    


 


ABG pCO2  45 mmHg (35-45)   01/09/20  01:10    


 


ABG pCO2 (Temp Corrct  Not Reportable   01/09/20  01:10    


 


ABG pO2  78 mmHg ()  L  01/09/20  01:10    


 


ABG pO2 (Temp Correct  Not Reportable   01/09/20  01:10    


 


ABG HCO3  27.4 mmol/L (19-31)   01/09/20  01:10    


 


ABG O2 Saturation  97.1 % (94.0-98.0)   01/09/20  01:10    


 


ABG Base Excess  3.2 mmol/L (-2.0-2.0)  H  01/09/20  01:10    


 


Respiration Rate  Not Reportable   01/09/20  01:10    


 


O2 Delivery Device  nasal cannula   01/09/20  01:10    


 


Ventilator Type  Not Reportable   01/09/20  01:10    


 


Vent Mode  Not Reportable   01/09/20  01:10    


 


FiO2  36   01/09/20  01:10    


 


Inspiratory Time  Not Reportable   01/09/20  01:10    


 


PEEP  Not Reportable   01/09/20  01:10    


 


Pressure Support  Not Reportable   01/09/20  01:10    


 


Pressure Control  Not Reportable   01/09/20  01:10    


 


EPAP  Not Reportable   01/09/20  01:10    


 


IPAP  Not Reportable   01/09/20  01:10    


 


BiPAP  Not Reportable   01/09/20  01:10    


 


Sodium  138 mmol/L (135-145)   01/11/20  12:03    


 


Potassium  3.5 mmol/L (3.5-5.0)   01/11/20  12:03    


 


Chloride  103 mmol/L (101-111)   01/11/20  12:03    


 


Carbon Dioxide  26 mmol/L (22-32)   01/11/20  12:03    


 


Anion Gap  9 mmol/L (2-11)   01/11/20  12:03    


 


BUN  7 mg/dL (6-24)   01/11/20  12:03    


 


Creatinine  0.76 mg/dL (0.67-1.17)   01/11/20  12:03    


 


Est GFR ( Amer)  128.8  (>60)   01/11/20  12:03    


 


Est GFR (Non-Af Amer)  106.5  (>60)   01/11/20  12:03    


 


BUN/Creatinine Ratio  9.2  (8-20)   01/11/20  12:03    


 


Glucose  103 mg/dL ()  H  01/11/20  12:03    


 


Hemoglobin A1c  5.4 % (4.0-5.6)   01/10/20  07:42    


 


Calcium  8.5 mg/dL (8.6-10.3)  L  01/11/20  12:03    


 


Total Bilirubin  0.70 mg/dL (0.2-1.0)   01/08/20  23:35    


 


AST  30 U/L (13-39)   01/08/20  23:35    


 


ALT  38 U/L (7-52)   01/08/20  23:35    


 


Alkaline Phosphatase  52 U/L ()   01/08/20  23:35    


 


Total Protein  6.4 g/dL (6.4-8.9)   01/08/20  23:35    


 


Albumin  3.2 g/dL (3.2-5.2)   01/08/20  23:35    


 


Globulin  3.2 g/dL (2-4)   01/08/20  23:35    


 


Albumin/Globulin Ratio  1.0  (1-3)   01/08/20  23:35    


 


Triglycerides  122 mg/dL  01/10/20  07:42    


 


Cholesterol  124 mg/dL  01/10/20  07:42    


 


LDL Cholesterol  75 mg/dL  01/10/20  07:42    


 


HDL Cholesterol  25.0 mg/dL  01/10/20  07:42    


 


TSH  2.02 mcIU/mL (0.34-5.60)   01/08/20  23:35    


 


Urine Color  Yellow   01/09/20  12:00    


 


Urine Appearance  Clear   01/09/20  12:00    


 


Urine pH  6.0  (5-9)   01/09/20  12:00    


 


Ur Specific Gravity  1.017  (1.010-1.030)   01/09/20  12:00    


 


Urine Protein  Negative  (Negative)   01/09/20  12:00    


 


Urine Ketones  Trace  (Negative)  A  01/09/20  12:00    


 


Urine Blood  Negative  (Negative)   01/09/20  12:00    


 


Urine Nitrate  Negative  (Negative)   01/09/20  12:00    


 


Urine Bilirubin  Negative  (Negative)   01/09/20  12:00    


 


Urine Urobilinogen  Negative  (Negative)   01/09/20  12:00    


 


Ur Leukocyte Esterase  Negative  (Negative)   01/09/20  12:00    


 


Urine Glucose  Negative  (Negative)   01/09/20  12:00    


 


Salicylates  < 2.50 mg/dL (<30)   01/08/20  23:35    


 


Urine Opiates Screen  Presumptive positive  (None Detect)  A  01/09/20  12:00  

  


 


Acetaminophen  < 15 mcg/mL  01/08/20  23:35    


 


Ur Barbiturates Screen  None detected  (None Detect)   01/09/20  12:00    


 


Ur Phencyclidine Scrn  None detected  (None Detect)   01/09/20  12:00    


 


Ur Amphetamines Screen  None detected  (None Detect)   01/09/20  12:00    


 


U Benzodiazepines Scrn  Presumptive positive  (None Detect)  A  01/09/20  12:00

    


 


Urine Cocaine Screen  None detected  (None Detect)   01/09/20  12:00    


 


U Cannabinoids Screen  None detected  (None Detect)   01/09/20  12:00    


 


Serum Alcohol  < 10 mg/dL (<10)   01/08/20  23:35    














- Objective


Active Medications: 








Acetaminophen (Tylenol Tab*)  650 mg PO Q4H PRN


   PRN Reason: for pain; or Temp >101 F


   Last Admin: 01/13/20 13:41 Dose:  650 mg


Al Hydrox/Mg Hydrox/Simethicone (Maalox Plus*)  30 ml PO Q4H PRN


   PRN Reason: INDIGESTION


Albuterol (Ventolin Hfa Inhaler*)  1 puff INH Q6HR PRN


   PRN Reason: SHORTNESS OF BREATH


   Last Admin: 01/10/20 09:25 Dose:  1 puff


Albuterol/Ipratropium (Duoneb (Albuterol 2.5 Mg/Ipratropium 0.5 Mg))  1 neb INH 

Q4H PRN


   PRN Reason: SHORTNESS OF BREATH


   Last Admin: 01/11/20 10:41 Dose:  1 neb


Alprazolam (Xanax Tab*)  2 mg PO TID PRN


   PRN Reason: ANXIETY


   Last Admin: 01/13/20 13:41 Dose:  2 mg


Baclofen (Lioresal  Tab*)  20 mg PO QID [facility]


   Last Admin: 01/13/20 13:13 Dose:  20 mg


Cefdinir (Cefdinir Cap*)  300 mg PO BID [facility]


   Stop: 01/18/20 12:59


   Last Admin: 01/13/20 09:16 Dose:  300 mg


Citalopram Hydrobromide (Celexa Tab*)  40 mg PO DAILY [facility]


   Last Admin: 01/13/20 09:15 Dose:  40 mg


Diphenhydramine HCl (Benadryl Po*)  25 mg PO BEDTIME PRN


   PRN Reason: SLEEP


Doxycycline Hyclate (Vibramycin Cap(*))  100 mg PO BID [facility]


Epinephrine HCl (Adrenalin 1 Mg/Ml)  0.3 mg IM DAILY PRN


   PRN Reason: ALLERGY SYMPTOMS


Folic Acid (Folvite Tab*)  1 mg PO DAILY [facility]


   Last Admin: 01/13/20 09:16 Dose:  1 mg


Guaifenesin (Mucinex*)  600 mg PO BID [facility]


   Last Admin: 01/13/20 09:15 Dose:  600 mg


Hydromorphone HCl (Dilaudid Tab*)  4 mg PO QID PRN


   PRN Reason: PAIN


   Last Admin: 01/13/20 14:47 Dose:  4 mg


Methylphenidate HCl (Concerta Er Tab*)  18 mg PO DAILY [facility]


   Last Admin: 01/13/20 09:16 Dose:  18 mg


Miscellaneous (Ativan Pyxis Nuñez)  1 ea N/A .ATIVAN IV KEY PRN


   PRN Reason: PYXIS KEY


Mometasone Furoate/Formoterol Fumar (Dulera 100/5 Mdi*)  1 puff INH BID [facility]


   Last Admin: 01/13/20 09:17 Dose:  1 puff


Multivitamins/Minerals (Theragran/Minerals Tab*)  1 tab PO DAILY [facility]


   Last Admin: 01/13/20 09:16 Dose:  1 tab


Oxycodone HCl (Oxycontin(*))  20 mg PO Q12HR [facility]


   Last Admin: 01/13/20 09:15 Dose:  20 mg


Pantoprazole Sodium (Protonix Tab*)  40 mg PO DAILY [facility]


   Last Admin: 01/13/20 09:15 Dose:  40 mg


Zolpidem Tartrate (Ambien Tab*)  10 mg PO BEDTIME [facility]


   Last Admin: 01/12/20 21:36 Dose:  10 mg








Vital Signs: 


Vital Signs:











Temp Pulse Resp BP Pulse Ox


 


 97.9 F   78   16   99/60   96 


 


 01/13/20 09:07  01/13/20 09:07  01/13/20 14:47  01/13/20 09:07  01/13/20 09:07











Patient Weight: 


 





Weight                           113.398 kg








Intake and Output: 


 





ADLs: Meal  Record                                         Start:  01/09/20 14:

50


Freq:                                                      Status: Active      

  


Protocol:                                                                      

  


 Created      01/09/20 14:50  System  (Rec: 01/09/20 14:50  System  [id number])


Intake and Output                                          Start:  01/08/20 22:

53


Freq:                                                      Status: Active      

  


Protocol:                                                                      

  


 Created      01/08/20 22:53  System  (Rec: 01/08/20 22:53  System  [id number])








Eyes: No Scleral Icterus


Ears/Nose/Mouth/Throat: Clear Oropharnyx, Mucous Membranes Moist


Neck: NL Appearance and Movements; NL JVP, Trachea Midline


Cardiovascular: NL Sounds; No Murmurs; No JVD, No Edema


Respiratory: - - diffuse wheeze


Abdominal: NL Sounds; No Tenderness; No Distention


Extremities: No Edema, No Clubbing, Cyanosis


Neurological: Alert and Oriented x 3





- Assessment


Assessment: 


55yo male with depression, COPD, multiple sclerosis and lung ca in remission





- Plan


Consult Plan (MU): Palliative


Plan: 


Long discussion with pt about goals of care and fears/worries. Pt worked as an 

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 

patients he saw at his work. Clarified he has a completed MOLST on his chart DNR

/DNI and we completed a HCP together which is on the chart and I gave him a 

copy. SW has sent a referral to [facility] for hospice although I am not sure 

he will qualify. His lung cancer is stage 3 and in remission and his multiple 

sclerosis is stable but bothersome. His biggest issue is his COPD he is on 4 

liters, he has CPAP at night for obstructive sleep apnea which he doesn't use 

because the mask is uncomfortable. He also doesn't like his symbicort feels it 

isn't effective to consider alternate drug. At this time his COPD is his 

biggest issue and needs to be at maximal therapy. Pt should follow up with Dr. Hunt after discharge. Pt gets hospitalized at [facility] often for his 

COPD. Pt could benefit from bowel meds and something for anxiety. We discussed 

the progression of chronic disease and how hard it is to cope with chronic 

disease. KPS 60%, PPS 60%





- Time On Unit


Date of Evaluation: 01/13/20


Hospice Consult Time in: 14:50


Hospice Consult Time Out: 15:50


Hospice Consult Time Total: 60


> 50% of Time Spend In Counseling or Coordinating Care: Yes

## 2020-01-13 NOTE — PN
Subjective





- Subjective


Date of Service: 01/13/20


Service Type: 38947 Hosp care 35 min high complexity


Subjective: 





Nursing Report: no behavioral incidents, mostly in his room





CC: " My lung hurts"





Patient was seen and evaluated in his room. He reported that his lung hurt.  

The patient reported having more energy after taking  concerta and feels less 

sluggish and less indifferent.  Patient reported that he is tolerating 

medications without side effects.  The patient is requesting  discharge home 

tomorrow.


 





Objective





- General Observations


Appearance: Disheveled


Appears Stated Age: Yes


Stature: WNL


Posture: Slumped


Eye Contact: Average


Behavior/Activity: WNL





- Interaction Observations


Attitude Towards Examiner: Cooperative


Stated Mood: Irritable


Affect: Restricted


Speech Pattern/Tone: Clear


Thought Process: Coherent


Perception: WNL


Thought Content: Self-Deprecatory


Hallucination Type: None


Delusion Type: None





- Cognitive Function


Orientation: A&O x 4


Level of Consciousness: Awake





- Medication Compliance


Cooperative with Inpatient Medication Regimen: Yes





- Group Participation


Participates in Group Activities: No





Assessment





- Assessment


Merits Inpatient Hospitalization: For Immediate Safety


Inpatient DSM-V Dx: F32.2


Clinical Impression: 





Mr Gomes has been admitted to this locked psychiatric unit for safety, 

assessment and treatment after threatening to kill himself by shooting himself.

  He demonstrates poor insight into circumstances raising concern with his 

significant other leading to efforts to ensure his safety.  He has multiple 

medical issues, now with recurrent pneumonia.  He continues to merit this level 

of care.





Plan





- Plan


Treatment Plan: 


Name: DAXA GOMES                        


YOB: 1965                        


[id number]


[id number]














# The patient requires psychiatric inpatient admission at this time to assure 

safety, receive treatment and work toward stabilization.


# Obtained collateral information from his brother Nader


# DNR status


# Collaboration with Social Work


#Constant Observation 


#Order for Oxygen compressor and Wheelchair and reclining chair 


# Firearms removed by  


# Hospital consult placed with plan to assist with MOLST form and oncology/ and 

palliative care resources 


# Safe ACT completed 


#Continue methylphenidate 18mg daily for cancer related depression and 

associated lethargy


# Palliative care referral completed 


# Hospitalist team evaluated patient and determined that he does not require 

inpatient medical services


# Ethics consult completed with Nader Ruben 


#  verified RX and doesnt require additional supply of xanax or pain 

medications upon discharge 


# Patient has capacity to decide code status and at this time his  wishes 

include to be DNR code status. 








#Goals before discharge include:  To eliminate/ reduce suicidal ideation





Tentative Discharge: Tuesday 





 











ABG pH  7.41  (7.35-7.45)   01/09/20  01:10    


 


ABG HCO3  27.4 mmol/L (19-31)   01/09/20  01:10    


 


Sodium  139 mmol/L (135-145)   01/08/20  23:35    


 


Potassium  3.9 mmol/L (3.5-5.0)   01/08/20  23:35    


 


BUN  12 mg/dL (6-24)   01/08/20  23:35    


 


Creatinine  0.86 mg/dL (0.67-1.17)   01/08/20  23:35    


 


Hemoglobin A1c  5.4 % (4.0-5.6)   01/10/20  07:42    


 


Calcium  8.5 mg/dL (8.6-10.3)  L  01/08/20  23:35    


 


AST  30 U/L (13-39)   01/08/20  23:35    


 


ALT  38 U/L (7-52)   01/08/20  23:35    


 


Triglycerides  122 mg/dL  01/10/20  07:42    


 


Cholesterol  124 mg/dL  01/10/20  07:42    


 


LDL Cholesterol  75 mg/dL  01/10/20  07:42    








Update 1.12.20 - Continue current plan.  Hospitalist following re pneumonia.


Continued Medication Management: Continue Outpt Medication


Medications: 


 Current Medications





Acetaminophen (Tylenol Tab*)  650 mg PO Q4H PRN


   PRN Reason: for pain; or Temp >101 F


   Last Admin: 01/12/20 19:50 Dose:  650 mg


Al Hydrox/Mg Hydrox/Simethicone (Maalox Plus*)  30 ml PO Q4H PRN


   PRN Reason: INDIGESTION


Albuterol (Ventolin Hfa Inhaler*)  1 puff INH Q6HR PRN


   PRN Reason: SHORTNESS OF BREATH


   Last Admin: 01/10/20 09:25 Dose:  1 puff


Albuterol/Ipratropium (Duoneb (Albuterol 2.5 Mg/Ipratropium 0.5 Mg))  1 neb INH 

Q4H PRN


   PRN Reason: SHORTNESS OF BREATH


   Last Admin: 01/11/20 10:41 Dose:  1 neb


Alprazolam (Xanax Tab*)  2 mg PO TID PRN


   PRN Reason: ANXIETY


   Last Admin: 01/12/20 19:50 Dose:  2 mg


Azithromycin (Zithromax Tab*)  250 mg PO DAILY [facility]


   Stop: 01/15/20 09:01


   Last Admin: 01/13/20 09:15 Dose:  250 mg


Baclofen (Lioresal  Tab*)  20 mg PO QID [facility]


   Last Admin: 01/13/20 09:16 Dose:  20 mg


Cefdinir (Cefdinir Cap*)  300 mg PO BID [facility]


   Stop: 01/18/20 12:59


   Last Admin: 01/13/20 09:16 Dose:  300 mg


Citalopram Hydrobromide (Celexa Tab*)  40 mg PO DAILY [facility]


   Last Admin: 01/13/20 09:15 Dose:  40 mg


Diphenhydramine HCl (Benadryl Po*)  25 mg PO BEDTIME PRN


   PRN Reason: SLEEP


Epinephrine HCl (Adrenalin 1 Mg/Ml)  0.3 mg IM DAILY PRN


   PRN Reason: ALLERGY SYMPTOMS


Folic Acid (Folvite Tab*)  1 mg PO DAILY [facility]


   Last Admin: 01/13/20 09:16 Dose:  1 mg


Guaifenesin (Mucinex*)  600 mg PO BID [facility]


   Last Admin: 01/13/20 09:15 Dose:  600 mg


Hydromorphone HCl (Dilaudid Tab*)  4 mg PO QID PRN


   PRN Reason: PAIN


   Last Admin: 01/12/20 19:50 Dose:  4 mg


Methylphenidate HCl (Concerta Er Tab*)  18 mg PO DAILY [facility]


   Last Admin: 01/13/20 09:16 Dose:  18 mg


Miscellaneous (Ativan Pyxis Nuñez)  1 ea N/A .ATIVAN IV KEY PRN


   PRN Reason: PYXIS KEY


Mometasone Furoate/Formoterol Fumar (Dulera 100/5 Mdi*)  1 puff INH BID [facility]


   Last Admin: 01/13/20 09:17 Dose:  1 puff


Multivitamins/Minerals (Theragran/Minerals Tab*)  1 tab PO DAILY [facility]


   Last Admin: 01/13/20 09:16 Dose:  1 tab


Oxycodone HCl (Oxycontin(*))  20 mg PO Q12HR [facility]


   Last Admin: 01/13/20 09:15 Dose:  20 mg


Pantoprazole Sodium (Protonix Tab*)  40 mg PO DAILY [facility]


   Last Admin: 01/13/20 09:15 Dose:  40 mg


Zolpidem Tartrate (Ambien Tab*)  10 mg PO BEDTIME [facility]


   Last Admin: 01/12/20 21:36 Dose:  10 mg











- Discharge Plan


Discharge Plan: Inpatient Hospitalization

## 2020-01-13 NOTE — PN
Hospitalist Progress Note


Date of Service: 01/13/20





Patient is ok to discharge home from medical stand point.  Patient is at his 

baseline o2 requirement, respiration are 16, no acute respiratory distress, 

afebrile.


He will need to continue on doxycycline 100 mg po BID for 4 more days and 

cefdinir 300 mg po bid for 4 more days.





Hospital medicine will sign off please don' t hesitate to contact us for 

further recommendations as needed

## 2020-01-14 VITALS — SYSTOLIC BLOOD PRESSURE: 99 MMHG | DIASTOLIC BLOOD PRESSURE: 64 MMHG

## 2020-01-14 RX ADMIN — BACLOFEN SCH MG: 20 TABLET ORAL at 09:13

## 2020-01-14 RX ADMIN — CEFDINIR SCH MG: 300 CAPSULE ORAL at 09:14

## 2020-01-14 RX ADMIN — OXYCODONE HYDROCHLORIDE SCH MG: 10 TABLET, FILM COATED, EXTENDED RELEASE ORAL at 09:15

## 2020-01-14 RX ADMIN — ALPRAZOLAM PRN MG: 0.5 TABLET ORAL at 05:55

## 2020-01-14 RX ADMIN — PANTOPRAZOLE SODIUM SCH MG: 40 TABLET, DELAYED RELEASE ORAL at 09:16

## 2020-01-14 RX ADMIN — THERA TABS SCH TAB: TAB at 09:16

## 2020-01-14 RX ADMIN — CITALOPRAM SCH MG: 40 TABLET ORAL at 09:16

## 2020-01-14 RX ADMIN — METHYLPHENIDATE HYDROCHLORIDE SCH MG: 18 TABLET, EXTENDED RELEASE ORAL at 09:15

## 2020-01-14 RX ADMIN — MOMETASONE FUROATE AND FORMOTEROL FUMARATE DIHYDRATE SCH: 100; 5 AEROSOL RESPIRATORY (INHALATION) at 09:17

## 2020-01-14 RX ADMIN — GUAIFENESIN SCH MG: 600 TABLET, EXTENDED RELEASE ORAL at 09:14

## 2020-01-14 RX ADMIN — FOLIC ACID SCH MG: 1 TABLET ORAL at 09:15

## 2020-01-14 RX ADMIN — HYDROMORPHONE HYDROCHLORIDE PRN MG: 4 TABLET ORAL at 05:55

## 2020-01-14 NOTE — DS
Subjective





- Subjective


Service Types: [address] [facility] complex over 30 min


Discharge Date: 01/14/20


Subjective: 








CC: " I am better"





Patient looks forward to seeing his brother today and hearing from care 

services. 


The patient was seen and evaluated before discharge today.  The patient 

reported that being in the hospital helped and he was able to fill out the 

MOLST form and be referred to palliative care services.  Per nursing no 

behavioral issues or overnight events reported. Patient reported tolerating 

medications without side effects.  


--------------------------------------------------------------------------------

----------------------------------------------------------





Justification for admission:  Immediate Safety. 





CC " I just want to be comfortable"





The patient was brought to [facility] by his brother after he placed 

guns on his bed in order to use them to end his life. He was treated at 

[facility] for pneumonia and received supportive measures against 

his wishes because he did not have MOLST form completed. He  reported having 

stage 4 lung cancer and fears being in pain and feeling that he is being 

suffocated. He reported being angry about not getting comfort care after he 

thought the necessary paperwork was already completed. He explained that he has 

been thinking about his code status for some time (since 2016) and after 

receiving radiation and chemotherapy wishes to only receive comfort care. He 

wished to appoint his brother Nader to be his health care proxy. He explained the 

nature of his illness and expressed the degree of suffering he has endured. The 

guns were removed from the home and this was confirmed by his brother.


He reported poor sleep and normal appetite. The patient reported being in pain 

and has difficulty breathing most of the day and is upset at his girlfriend 

because he thought she filled out the paperwork so that he would not receive 

life sustaining treatment. The patient denied homicidal ideation intent or 

plan. The patient denied auditory and/  or visual hallucinations. 





MDD


Reported  feeling depressed while having diminished interests which were found 

to be enjoyable in the past. Reported feelings of hopelessness , and  

worthlessness. Reported  loss of energy  or lack of motivation to complete 

tasks. Reported thoughts that he would be better off dead.  





Anxiety 


Denied having symptoms of anxiety such as having times where heart feels that 

it is beating out of chest  , sweaty palms, or shallow breathing.  Denied 

having uncomfortable or intrusive thoughts. Denied  feeling restless, high 

strung, or worrying too much most of the time. 





Bipolar  


Denied symptoms of sathish such as having many ideas at once. Denied increased 

talkativeness where  no one can interrupt.  


Denied  feeling irritable most of the time while having an persistent abundance 

of energy most of the day without the use of energy drinks, stimulants, or 

recreational drug use. Denied an increase in intensity in goal directed 

activities. Denied having the decreased need to sleep for days , having 

prolonged elevated  mood , or feeling on top of the world. 


 Denied impulsive risky sexual encounters. Denied spending money recklessly , 

going on spending sprees wiping out savings.  Denied impulsively traveling out 

of town or country, having super martel, and unrealistic wealth or fame.  





Psychosis 


Does not endorse  hearing  things that other people do not hear or seeing 

things other people do not see.  Denied  feeling that TV is making references. 

Denied  feeling that people are spying  , following , or reading their  

thoughts. 





Phobias: 


Patient denied having excessive fear of a particular thing or situation. 





Eating disorders:


 Patient denied having excessive eating habits or feelings of guilt after 

eating. Denied repeated episodes of self induced vomiting after eating. 





PTSD


Denied flashbacks, nightmares and avoidance of a prior traumatic event.  





PAST PSYCHIATRIC HISTORY:


         Prior Diagnosis :  Major Depressive Disorder


         History of past Psychiatric Hospitalizations:  1 prior psychiatric 

admission at [facility].


          History of past suicide/homicide attempts : Refused to elaborate but 

mentioned 1 suicide attempt unknown method in 2016. Denied self injurious 

behaviors.  


         Outpatient follow-up:  Dr. Pompa PCP


         Medications: Past trials of medications include celexa, remeron 

alprazolam, zolpidem 


         Guardianship: None. Health care proxy Nader his brother





 FAMILY HISTORY: 


- Suicide: Denied family history of suicide. 


- Mental illness:  Denied a history of mental health in immediate family 

members.


- Substance abuse: Father abused alcohol





SUBSTANCE ABUSE HISTORY:


- EtOH: Denied  No associated legal issues, blackouts, seizures, DTs or past 

hospitalizations due to alcohol.  


- Tobacco:  smoked since age 11 until 2016 and quit 3 years ago declined 

replacement therapy


- Cannabis: Denied 


- Heroin: Denied 


- Cocaine: Denied 


- Substance abuse treatment: Denied past substance abuse treatment 





SOCIAL HISTORY:


Born in [place] [facility] [place]  and raised by both parents.  


- Living situation:  Currently lives in [place] with brother and girlfriend 


- Employment history: worked as EMT for 30 years


- Relationship:  4 children and currently not  but in a relationship 

with his girlfriend whom he lives with


- Legal history: Denied


-  service history: Denied





PAST MEDICAL HISTORY:    


Stage 4 Lung cancer, [place]. 


- Allergies:  Hydromorphone 


 


Physical Exam:  


Please see ED note 





Mental Status Exam on Admission 


APPEARANCE :   54 year old  male using wheelchair to ambulate. Patient 

is disheveled and appears to have poor hygiene and grooming


BEHAVIOR: Irritable 


EYE CONTACT: Fair


PSYCHOMOTOR ACTIVITY: No psychomotor agitation or retardation. 


MOVEMENTS:  Bilateral upper extremity tremor 


SPEECH : coherent normal volume 


MOOD : "Angry"


AFFECT : 


Type is irritable and angry. 


Range is restricted 


Mood  Incongruent


THOUGHT PROCESS:  Formulated and organized in a logical, linear goal directed 

manner.   


No flight of ideas,  neologism (made up words) , perseveration , tangential , 

loose associations , or circumstantiality.


THOUGHT CONTENT: no delusions, obsessions, phobias or preoccupations.


PERCEPTION: No current auditory or visual hallucinations. Doesnt appear to be 

responding to internal cues. No evidence of depersonalization , de-realization, 

or illusions


SUICIDALITY     suicidal ideation with plan to end life with gun


HOMICIDALITY  Denied homicidal ideation, intent or plan.


Insight/judgment: Poor insight and judgment


ORIENTATION: Oriented to self, location, and time.





Diagnosis on Admission: Major depressive disorder. severe.





 Diagnosis on Discharge: Major depressive disorder in partial remission. 





Condition at the time of discharge:  At the time of discharge patient showed 

improvement of sleep and appetite. The patient was not a danger to self or 

others. The patient denied suicidal ideation, intent or plan.  The patient 

denied homicidal targets, ideation, intent or plan.  The patient gained insight 

into mental illness, triggers, and treatment.  The patient took medication as 

prescribed. The patient denied side effects of medication and objective signs 

of side effects were not evident.  Therapy Resources were offered to the 

patient. Patient was given a supply of prescriptions at the time of discharge. 

The patient plans to attend follow up care with the follow up arrangements that 

were discussed and put in place. Patient was asked to keep appointments as 

scheduled, take medication as prescribed, have routine follow up care with 

their primary care physician and refrain from any use of alcohol or drugs.





Objective





- General Observations


Appearance: Disheveled


Appears Stated Age: Yes


Stature: WNL


Posture: Slumped


Eye Contact: Average


Behavior/Activity: WNL





- Interaction Observations


Attitude Towards Examiner: Cooperative


Stated Mood: Dysphoric


Affect: Restricted


Speech Pattern/Tone: Clear


Thought Process: Coherent


Perception: WNL


Thought Content: WNL


Hallucination Type: None


Delusion Type: None





- Cognitive Function


Orientation: A&O x 4


Level of Consciousness: Awake





- Medication Compliance


Cooperative with Inpatient Medication Regimen: Yes





- Group Participation


Participates in Group Activities: No





Treatment Course & Assessment


Clinical Course & Impression: 








Hospital course part A:   Mr Sanchez has been admitted to this locked psychiatric 

unit for safety, assessment and treatment after threatening to kill himself by 

shooting himself.  He demonstrates poor insight into circumstances raising 

concern with his significant other leading to efforts to ensure his safety.  He 

has multiple medical issues, now with recurrent pneumonia.  





Hospital course part B: 


Labs ordered included CBC, CMP, UDS, TSH, HBA1c, TSH, Toxicology screen, Urine 

analysis, and lipid profile.   Labs were reviewed and vital signs were 

monitored during the course of admission.





The patient was admitted to the adult behavioral unit and placed on constant 

observation due to his medical status and need for oxygen supplementation.   

There were no occurrence of behavioral incidents.  Tolerated medication changes 

without side effects. Group therapy and services were offered. The risks, 

benefits, and alternative treatment options were discussed as well as of the 

risks of refusing treatment. Treatment associated risks discussed. After this 

discussion the patient made an acknowledgement of this understanding.  Follow 

up care appointments were put in place.  The patient was informed not to 

abruptly stop or start new medications before consulting with a medical 

professional.





Improvements in patient from the time of admission include: Improved affect, 

sleep and decrease in anxiety. The patient expressed readiness for discharge 

home. The patient presents with a broader range of affect.  The patient denied 

suicidal and or homicidal ideation intent or plan. Overall, the patient 

responded well to inpatient treatment as evidenced by their report of 

strengthening of coping mechanisms, reduced distress. 





His brother who is also his healthcare proxy are in agreement with DNR status 

and both wish to pursue hospice care upon discharge. Hospital consult placed 

and MOLST form was completed and referral to palliative care resources was 

made.  Safe ACT completed. Patient was determined to have capacity to decide 

code status and wished to be DNR code status. He listed multiple reasons why he 

choose to be DNR. 





Safety precautions were put in place which included involving the patient and 

their family to closely monitor for changes in mental state. In addition, 

implementing follow up care, screening for the need to remove/securing firearms

, weapons and stockpile of medications. Patient/ family instructed to 

immediately call 911 should any safety concerns arise.  


 was checked and is receiving controlled substances oxycodone 20mg BID 30 

day supply  dispensed 12/20/19 and xanax 1mg TID 30 day supply dispensed 12/19/ 19     Patient advised of the lethality and dangerousness of combining 

medications with pain medications and/ or with alcohol and to only take 

medication the way his provider recommends and he  acknowledged this 

understanding. 








The patient was advised of the 24 hour / 7 days a week availability of the 

emergency room and to call 911 in the event of an emergency such as being 

suicidal and/ or homicidal. The patient was informed of the contact information 

for [facility], Suicide Prevention and 

Crisis Services, National Suicide Prevention Lifeline, [facility], Alcoholics Anonymous, and [facility] 

[facility]. 





Medications started included methylphenidate 18mg daily for cancer related 

depression and lethargy and home medications were resumed. Patients medical 

conditions include stage 3 lung cancer, multiple sclerosis, COPD, pneumonia. 

Hospital service was contacted before discharge and did not recommend further 

medical treatment in the inpatient setting.  Patient was provided with a refill 

of celexa and and methylphenidate upon discharge as well as doxycycline and 

cefdinir. The rest of his medications he did not require additional supply at 

this time and plans to follow up with PCP. 





Family was contacted before discharge. His brother plans to look over him after 

he is discharged. The family confirmed that the patient is at their baseline.  

At this time the patient is eager for discharge and is in agreement with the 

discharge plan.  Patients family was advised on how the days following 

discharge can be a vulnerable period and to look out for warning signs 

associated with decompensation and progression of mental illness. They were 

notified of the resources available in the event these situations arise and 

confirmed that the patient has no access to firearms or stockpiles of 

medications. His brother Nader removed firearms from house and out of his access. 





Consults included to hospitalist and palliative care. 


Before admission patient was hostile and assaultive in the emergency department 

and this did not occur after his admission on the BSU. The patient showed poor 

hygiene and was unable to carry out activities of daily living on his own.





Patient will be discharged to live at home with his brother.  Referral for 

palliative care and visiting care services was completed . 





Follow up appointment at  [facility] 


Patient informed of follow up appointment times. See more details for follow up 

care in the discharge plan. 





Risk factors were mitigated by establishing the patients baseline with close 

contacts .  Implemented precautionary safety measures by confirming no 

stockpiles of medications and no access to firearms, provided mental health 

treatment,stabilization of depressive features, arrangement of outpatient 

continuation of care, as well as provided a supportive care environment and 

therapy resources during the course of hospitalization.   Safety plan was 

reviewed with the patient and treatment team and the patient verbalized options 

they could pursue to ensure their safety in the event they feel unsafe and not 

doing well.





Risk factors: Male, , Age,  history of depression.  Prior history of 

a suicide attempt. Multiple co morbid medical conditions. 





Protective factors:  Currently no suicidal ideation, intent or plan. No suicide 

attempts in the last year, in a relationship, has children. Has family support 

system. No history of  service. Currently no feelings of hopelessness, 

not in an occupation of social isolation, no family history of suicide, no 

longer has access to firearms. Doesnt have command hallucinations and or 

psychotic features at this time. No current substance abuse.  No current 

alcohol abuse.  Not an anniversary of a loss of a loved one.  No changes in 

relationship status, housing, job, or school. Currently future orientated. 

Patient engaged in treatment and compliant with medication.  No barriers to 

seek mental health treatment. 





 











ABG pH  7.41  (7.35-7.45)   01/09/20  01:10    


 


ABG HCO3  27.4 mmol/L (19-31)   01/09/20  01:10    


 


Sodium  138 mmol/L (135-145)   01/11/20  12:03    


 


Potassium  3.5 mmol/L (3.5-5.0)   01/11/20  12:03    


 


BUN  7 mg/dL (6-24)   01/11/20  12:03    


 


Creatinine  0.76 mg/dL (0.67-1.17)   01/11/20  12:03    


 


Hemoglobin A1c  5.4 % (4.0-5.6)   01/10/20  07:42    


 


Calcium  8.5 mg/dL (8.6-10.3)  L  01/11/20  12:03    


 


AST  30 U/L (13-39)   01/08/20  23:35    


 


ALT  38 U/L (7-52)   01/08/20  23:35    


 


Triglycerides  122 mg/dL  01/10/20  07:42    


 


Cholesterol  124 mg/dL  01/10/20  07:42    


 


LDL Cholesterol  75 mg/dL  01/10/20  07:42    











Merits Inpatient Hospitalization: No


Clear for Discharge: Adequate Clinical Respons


Inpatient DSM-V Dx: F32.2





Discharge Planning





- Discharge Planning


Discharge Plan: Outpatient Follow Up


Outpatient Program: [facility] 


Recommendations for Continuing Care: Medication Management, Primary Care 

Followup


Medications: 


 Current Medications





Acetaminophen (Tylenol Tab*)  650 mg PO Q4H PRN


   PRN Reason: for pain; or Temp >101 F


   Last Admin: 01/13/20 13:41 Dose:  650 mg


Al Hydrox/Mg Hydrox/Simethicone (Maalox Plus*)  30 ml PO Q4H PRN


   PRN Reason: INDIGESTION


Albuterol (Ventolin Hfa Inhaler*)  1 puff INH Q6HR PRN


   PRN Reason: SHORTNESS OF BREATH


   Last Admin: 01/10/20 09:25 Dose:  1 puff


Albuterol/Ipratropium (Duoneb (Albuterol 2.5 Mg/Ipratropium 0.5 Mg))  1 neb INH 

Q4H PRN


   PRN Reason: SHORTNESS OF BREATH


   Last Admin: 01/11/20 10:41 Dose:  1 neb


Alprazolam (Xanax Tab*)  2 mg PO TID PRN


   PRN Reason: ANXIETY


   Last Admin: 01/14/20 05:55 Dose:  2 mg


Baclofen (Lioresal  Tab*)  20 mg PO QID [facility]


   Last Admin: 01/13/20 20:39 Dose:  20 mg


Cefdinir (Cefdinir Cap*)  300 mg PO BID [facility]


   Stop: 01/18/20 12:59


   Last Admin: 01/13/20 20:39 Dose:  300 mg


Citalopram Hydrobromide (Celexa Tab*)  40 mg PO DAILY [facility]


   Last Admin: 01/13/20 09:15 Dose:  40 mg


Diphenhydramine HCl (Benadryl Po*)  25 mg PO BEDTIME PRN


   PRN Reason: SLEEP


Doxycycline Hyclate (Vibramycin Cap(*))  100 mg PO BID [facility]


Epinephrine HCl (Adrenalin 1 Mg/Ml)  0.3 mg IM DAILY PRN


   PRN Reason: ALLERGY SYMPTOMS


Folic Acid (Folvite Tab*)  1 mg PO DAILY [facility]


   Last Admin: 01/13/20 09:16 Dose:  1 mg


Guaifenesin (Mucinex*)  600 mg PO BID [facility]


   Last Admin: 01/13/20 20:38 Dose:  600 mg


Hydromorphone HCl (Dilaudid Tab*)  4 mg PO QID PRN


   PRN Reason: PAIN


   Last Admin: 01/14/20 05:55 Dose:  4 mg


Methylphenidate HCl (Concerta Er Tab*)  18 mg PO DAILY [facility]


   Last Admin: 01/13/20 09:16 Dose:  18 mg


Miscellaneous (Ativan Pyxis Nuñez)  1 ea N/A .ATIVAN IV KEY PRN


   PRN Reason: PYXIS KEY


Mometasone Furoate/Formoterol Fumar (Dulera 100/5 Mdi*)  1 puff INH BID [facility]


   Last Admin: 01/13/20 20:39 Dose:  1 puff


Multivitamins/Minerals (Theragran/Minerals Tab*)  1 tab PO DAILY [facility]


   Last Admin: 01/13/20 09:16 Dose:  1 tab


Oxycodone HCl (Oxycontin(*))  20 mg PO Q12HR [facility]


   Last Admin: 01/13/20 20:38 Dose:  20 mg


Pantoprazole Sodium (Protonix Tab*)  40 mg PO DAILY [facility]


   Last Admin: 01/13/20 09:15 Dose:  40 mg


Zolpidem Tartrate (Ambien Tab*)  10 mg PO BEDTIME [facility]


   Last Admin: 01/13/20 20:39 Dose:  10 mg








Discharge Planning: 


Prescriptions provided for discharge                  [x] Yes   [] No   





Follow up care details as per social work arrangements.


Patient response to discharge plan:   


                                                                 [x] eager for 

discharge


                                    [] agreeable with discharge plan


                                  [] ambivalent about discharge


                                   [] disagrees with discharge today